# Patient Record
Sex: FEMALE | Race: WHITE | Employment: OTHER | URBAN - METROPOLITAN AREA
[De-identification: names, ages, dates, MRNs, and addresses within clinical notes are randomized per-mention and may not be internally consistent; named-entity substitution may affect disease eponyms.]

---

## 2016-12-15 LAB — HBA1C MFR BLD: 7.9 %

## 2017-01-20 ENCOUNTER — OFFICE VISIT (OUTPATIENT)
Dept: INTERNAL MEDICINE CLINIC | Age: 67
End: 2017-01-20

## 2017-01-20 VITALS
DIASTOLIC BLOOD PRESSURE: 70 MMHG | WEIGHT: 164 LBS | HEART RATE: 108 BPM | BODY MASS INDEX: 27.32 KG/M2 | TEMPERATURE: 98.1 F | SYSTOLIC BLOOD PRESSURE: 130 MMHG | HEIGHT: 65 IN | OXYGEN SATURATION: 97 %

## 2017-01-20 DIAGNOSIS — E11.9 TYPE 2 DIABETES MELLITUS WITHOUT COMPLICATION, WITH LONG-TERM CURRENT USE OF INSULIN (HCC): ICD-10-CM

## 2017-01-20 DIAGNOSIS — Z79.4 TYPE 2 DIABETES MELLITUS WITHOUT COMPLICATION, WITH LONG-TERM CURRENT USE OF INSULIN (HCC): ICD-10-CM

## 2017-01-20 DIAGNOSIS — J40 BRONCHITIS: Primary | ICD-10-CM

## 2017-01-20 PROCEDURE — 3014F SCREEN MAMMO DOC REV: CPT | Performed by: INTERNAL MEDICINE

## 2017-01-20 PROCEDURE — 1123F ACP DISCUSS/DSCN MKR DOCD: CPT | Performed by: INTERNAL MEDICINE

## 2017-01-20 PROCEDURE — G8427 DOCREV CUR MEDS BY ELIG CLIN: HCPCS | Performed by: INTERNAL MEDICINE

## 2017-01-20 PROCEDURE — G8400 PT W/DXA NO RESULTS DOC: HCPCS | Performed by: INTERNAL MEDICINE

## 2017-01-20 PROCEDURE — 4040F PNEUMOC VAC/ADMIN/RCVD: CPT | Performed by: INTERNAL MEDICINE

## 2017-01-20 PROCEDURE — 1036F TOBACCO NON-USER: CPT | Performed by: INTERNAL MEDICINE

## 2017-01-20 PROCEDURE — 3017F COLORECTAL CA SCREEN DOC REV: CPT | Performed by: INTERNAL MEDICINE

## 2017-01-20 PROCEDURE — 99213 OFFICE O/P EST LOW 20 MIN: CPT | Performed by: INTERNAL MEDICINE

## 2017-01-20 PROCEDURE — G8420 CALC BMI NORM PARAMETERS: HCPCS | Performed by: INTERNAL MEDICINE

## 2017-01-20 PROCEDURE — 3046F HEMOGLOBIN A1C LEVEL >9.0%: CPT | Performed by: INTERNAL MEDICINE

## 2017-01-20 PROCEDURE — 1090F PRES/ABSN URINE INCON ASSESS: CPT | Performed by: INTERNAL MEDICINE

## 2017-01-20 PROCEDURE — G8484 FLU IMMUNIZE NO ADMIN: HCPCS | Performed by: INTERNAL MEDICINE

## 2017-01-20 RX ORDER — FLUOCINONIDE TOPICAL SOLUTION USP, 0.05% 0.5 MG/ML
SOLUTION TOPICAL 2 TIMES DAILY
COMMUNITY
End: 2019-08-09 | Stop reason: CLARIF

## 2017-01-20 RX ORDER — BENZONATATE 100 MG/1
100 CAPSULE ORAL 3 TIMES DAILY PRN
Qty: 30 CAPSULE | Refills: 1 | Status: SHIPPED | OUTPATIENT
Start: 2017-01-20 | End: 2017-01-27

## 2017-01-20 RX ORDER — DOXYCYCLINE HYCLATE 50 MG/1
50 CAPSULE ORAL 2 TIMES DAILY
COMMUNITY
End: 2019-04-01 | Stop reason: CLARIF

## 2017-01-20 RX ORDER — CEFUROXIME AXETIL 250 MG/1
250 TABLET ORAL 2 TIMES DAILY
Qty: 20 TABLET | Refills: 0 | Status: SHIPPED | OUTPATIENT
Start: 2017-01-20 | End: 2017-01-30

## 2017-01-20 ASSESSMENT — ENCOUNTER SYMPTOMS
EYES NEGATIVE: 1
COUGH: 1

## 2019-05-10 ENCOUNTER — HOSPITAL ENCOUNTER (OUTPATIENT)
Dept: GENERAL RADIOLOGY | Age: 69
Discharge: HOME | End: 2019-05-10
Payer: MEDICARE

## 2019-05-10 ENCOUNTER — HOSPITAL ENCOUNTER (OUTPATIENT)
Age: 69
Discharge: HOME | End: 2019-05-10
Payer: MEDICARE

## 2019-05-10 DIAGNOSIS — M19.049 OSTEOARTHRITIS OF FINGER, UNSPECIFIED LATERALITY: ICD-10-CM

## 2019-05-10 PROCEDURE — 73130 X-RAY EXAM OF HAND: CPT

## 2019-05-22 ENCOUNTER — HOSPITAL ENCOUNTER (OUTPATIENT)
Dept: GENERAL RADIOLOGY | Age: 69
Discharge: HOME | End: 2019-05-22
Payer: MEDICARE

## 2019-05-22 ENCOUNTER — HOSPITAL ENCOUNTER (OUTPATIENT)
Age: 69
Discharge: HOME | End: 2019-05-22
Payer: MEDICARE

## 2019-05-22 ENCOUNTER — OFFICE VISIT (OUTPATIENT)
Dept: RHEUMATOLOGY | Age: 69
End: 2019-05-22
Payer: MEDICARE

## 2019-05-22 VITALS
BODY MASS INDEX: 25.58 KG/M2 | SYSTOLIC BLOOD PRESSURE: 134 MMHG | HEIGHT: 66 IN | WEIGHT: 159.2 LBS | HEART RATE: 84 BPM | DIASTOLIC BLOOD PRESSURE: 75 MMHG

## 2019-05-22 DIAGNOSIS — M19.049 HAND ARTHRITIS: Primary | ICD-10-CM

## 2019-05-22 DIAGNOSIS — M19.049 HAND ARTHRITIS: ICD-10-CM

## 2019-05-22 DIAGNOSIS — Z11.59 ENCOUNTER FOR SCREENING FOR OTHER VIRAL DISEASES: ICD-10-CM

## 2019-05-22 LAB
A/G RATIO: 1.8 (ref 1.1–2.2)
ALBUMIN SERPL-MCNC: 4.2 G/DL (ref 3.4–5)
ALP BLD-CCNC: 70 U/L (ref 40–129)
ALT SERPL-CCNC: 13 U/L (ref 10–40)
ANION GAP SERPL CALCULATED.3IONS-SCNC: 10 MMOL/L (ref 3–16)
AST SERPL-CCNC: 18 U/L (ref 15–37)
BILIRUB SERPL-MCNC: 0.4 MG/DL (ref 0–1)
BUN BLDV-MCNC: 14 MG/DL (ref 7–20)
C-REACTIVE PROTEIN: 1.6 MG/L (ref 0–5.1)
CALCIUM SERPL-MCNC: 10 MG/DL (ref 8.3–10.6)
CHLORIDE BLD-SCNC: 104 MMOL/L (ref 99–110)
CO2: 29 MMOL/L (ref 21–32)
CREAT SERPL-MCNC: 0.5 MG/DL (ref 0.6–1.2)
GFR AFRICAN AMERICAN: >60
GFR NON-AFRICAN AMERICAN: >60
GLOBULIN: 2.4 G/DL
GLUCOSE BLD-MCNC: 89 MG/DL (ref 70–99)
HEPATITIS B CORE IGM ANTIBODY: NORMAL
HEPATITIS B SURFACE ANTIGEN INTERPRETATION: NORMAL
HEPATITIS C ANTIBODY INTERPRETATION: NORMAL
POTASSIUM SERPL-SCNC: 5.1 MMOL/L (ref 3.5–5.1)
RHEUMATOID FACTOR: 14 IU/ML
SEDIMENTATION RATE, ERYTHROCYTE: 5 MM/HR (ref 0–30)
SODIUM BLD-SCNC: 143 MMOL/L (ref 136–145)
TOTAL PROTEIN: 6.6 G/DL (ref 6.4–8.2)

## 2019-05-22 PROCEDURE — 99205 OFFICE O/P NEW HI 60 MIN: CPT | Performed by: INTERNAL MEDICINE

## 2019-05-22 PROCEDURE — 73130 X-RAY EXAM OF HAND: CPT

## 2019-05-22 PROCEDURE — 1090F PRES/ABSN URINE INCON ASSESS: CPT | Performed by: INTERNAL MEDICINE

## 2019-05-22 PROCEDURE — 1036F TOBACCO NON-USER: CPT | Performed by: INTERNAL MEDICINE

## 2019-05-22 PROCEDURE — G8419 CALC BMI OUT NRM PARAM NOF/U: HCPCS | Performed by: INTERNAL MEDICINE

## 2019-05-22 PROCEDURE — 3017F COLORECTAL CA SCREEN DOC REV: CPT | Performed by: INTERNAL MEDICINE

## 2019-05-22 PROCEDURE — 1123F ACP DISCUSS/DSCN MKR DOCD: CPT | Performed by: INTERNAL MEDICINE

## 2019-05-22 PROCEDURE — 4040F PNEUMOC VAC/ADMIN/RCVD: CPT | Performed by: INTERNAL MEDICINE

## 2019-05-22 PROCEDURE — G8427 DOCREV CUR MEDS BY ELIG CLIN: HCPCS | Performed by: INTERNAL MEDICINE

## 2019-05-22 PROCEDURE — G8400 PT W/DXA NO RESULTS DOC: HCPCS | Performed by: INTERNAL MEDICINE

## 2019-05-22 RX ORDER — NAPROXEN 500 MG/1
500 TABLET ORAL 2 TIMES DAILY WITH MEALS
Qty: 60 TABLET | Refills: 1 | Status: SHIPPED | OUTPATIENT
Start: 2019-05-22 | End: 2020-03-18 | Stop reason: SDUPTHER

## 2019-05-22 NOTE — PROGRESS NOTES
dactylitis, enthesitis, tenosynovitis and uveitis. She had left hand x-ray which showed changes consistent with degenerative arthritis mainly in her DIP and PIP joints. HPI  Review of Systems    REVIEW OF SYSTEMS:   Constitutional: No unanticipated weight loss or fevers. No fatigue and malaise. Integumentary: No rash, photosensitivity, malar rash, livedo reticularis, alopecia and Raynaud's symptoms, sclerodactyly, skin tightening  Eyes: negative for visual disturbance and persistent redness, discharge from eyes   ENT: - No tinnitus, loss of hearing, vertigo, or recurrent ear infections.  - No history of nasal/oral ulcers. - No history of dry eyes/dry mouth  Cardiovascular: No history of pericarditis, chest pain or murmur or palpitations  Respiratory: No shortness of breath, cough or history of interstitial lung disease. No history of pleurisy. No history of tuberculosis or atypical infections. Gastrointestinal: No history of heart burn, dysphagia or esophageal dysmotility. No change in bowel habits or any inflammatory bowel disease. Genitourinary: No history renal disease, miscarriages. Hematologic/Lymphatic: No abnormal bruising or bleeding, blood clots or swollen lymph nodes. Neurological: No history of headaches, seizure or focal weakness. No history of neuropathies, paresthesias or hyperesthesias, facial droop, diplopia  Psychiatric: No history of bipolar disease, depression  Endocrine: Denies any polyuria, polydipsia and osteoporosis  Allergic/Immunologic: No nasal congestion or hives. I have reviewed patients Past medical History, Social History and Family History as mentioned in her chart and this remains unchanged fromprevious.     Past Medical History:   Diagnosis Date    Anxiety     DM (diabetes mellitus), type 1 ([facility] [address]     Hyperlipidemia     Hypertension     Osteoarthritis     Urinary incontinence      Past Surgical History:   Procedure Laterality Date    APPENDECTOMY       [address] [address]    2    COLONOSCOPY  4/23/2015    normal-ghastine    GALLBLADDER SURGERY  1996    KNEE ARTHROSCOPY Left 2001     Social History     Socioeconomic History    Marital status:      Spouse name: Not on file    Number of children: 2    Years of education: Not on file    Highest education level: Not on file   Occupational History    Occupation: Retired     Comment: elementary school   Social Needs    Financial resource strain: Not on file    Food insecurity:     Worry: Not on file     Inability: Not on file   Questra needs:     Medical: Not on file     Non-medical: Not on file   Tobacco Use    Smoking status: Never Smoker    Smokeless tobacco: Never Used   Substance and Sexual Activity    Alcohol use: No    Drug use: No    Sexual activity: Yes     Partners: Female, Male   Lifestyle    Physical activity:     Days per week: Not on file     Minutes per session: Not on file    Stress: Not on file   Relationships    Social connections:     Talks on phone: Not on file     Gets together: Not on file     Attends Hindu service: Not on file     Active member of club or organization: Not on file     Attends meetings of clubs or organizations: Not on file     Relationship status: Not on file    Intimate partner violence:     Fear of current or ex partner: Not on file     Emotionally abused: Not on file     Physically abused: Not on file     Forced sexual activity: Not on file   Other Topics Concern    Not on file   Social History Narrative    Not on file     Family History   Problem Relation Age of Onset    Cancer Mother         Tigress leukemia    Colon Cancer Father     Heart Disease Maternal Grandmother     Heart Disease Maternal Grandfather     Heart Attack Maternal Grandfather     Arthritis Paternal Grandmother          PHYSICAL EXAM   Vitals:    05/22/19 1025   BP: 134/75   Site: Left Lower Arm   Pulse: 84   Weight: 159 lb 3.2 oz (72.2 kg)   Height: 5' 5.5\" ASSESSMENT:    1. Hand arthritis    2. Encounter for screening for other viral diseases         PLAN:     Amparo Cope was seen today for new patient. 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 Left hand x-ray with degenerative changes consistent with erosive osteoarthritis. Doubt rheumatoid arthritis or systemic rheumatic disease  -I will do workup to completely rule out rheumatoid arthritis  -Obtain right hand x-ray for comparison  -Start naproxen 500 mg twice a day  -     Comprehensive Metabolic Panel; Future  -     C-Reactive Protein; Future  -     Cyclic Citrul Peptide Antibody, IgG; Future  -     Hepatitis B Core Antibody, IgM; Future  -     Hepatitis B Surface Antigen; Future  -     Hepatitis C Antibody; Future  -     Sedimentation Rate; Future  -     Rheumatoid Factor; Future  -     ARIELA; Future  -     XR HAND RIGHT (MIN 3 VIEWS); Future  -     naproxen (NAPROSYN) 500 MG tablet; Take 1 tablet by mouth 2 times daily (with meals)    Encounter for screening for other viral diseases   -     Hepatitis B Surface Antigen; Future    The patient was advised that NSAID-type medications have two very important potential side effects: gastrointestinal irritation including hemorrhage and renal injuries. She was asked to take the medication with food and to stop if she experiences any GI upset. I asked her to call for vomiting, abdominal pain or black/bloody stools. The patient expresses understanding of these issues and questions were answered. The patient indicates understanding of these issues and agrees with the plan. Return in about 3 weeks (around 6/12/2019). The risks and benefits of my recommendations, as well as other treatment options, benefits and side effects werediscussed with the patient. All questions were answered.     I reviewed patient's history, referral documents and electronic medical records  Copy of consult note is being routedelectronically/faxed to referring physician         ######################################################################    I thank you for giving me theopportunity to participate in [facility]. If you have any questions or concerns please feel free to contact me. I look forward to following  Trino Henry along with you. Electronically signed by: Katelin Denney MD, 5/22/2019 11:14 AM    Documentation was done using voice recognition dragon software. Every effort was made to ensure accuracy;however, inadvertent unintentional computerized transcription errors may be present.

## 2019-05-23 LAB — ANTI-NUCLEAR ANTIBODY (ANA): NEGATIVE

## 2019-05-24 LAB — CCP IGG ANTIBODIES: 2 UNITS (ref 0–19)

## 2019-05-28 ENCOUNTER — TELEPHONE (OUTPATIENT)
Dept: RHEUMATOLOGY | Age: 69
End: 2019-05-28

## 2019-06-13 ENCOUNTER — OFFICE VISIT (OUTPATIENT)
Dept: RHEUMATOLOGY | Age: 69
End: 2019-06-13
Payer: MEDICARE

## 2019-06-13 VITALS
WEIGHT: 162 LBS | BODY MASS INDEX: 26.03 KG/M2 | DIASTOLIC BLOOD PRESSURE: 79 MMHG | HEART RATE: 91 BPM | SYSTOLIC BLOOD PRESSURE: 142 MMHG | HEIGHT: 66 IN

## 2019-06-13 DIAGNOSIS — R20.2 NUMBNESS AND TINGLING IN LEFT HAND: ICD-10-CM

## 2019-06-13 DIAGNOSIS — M19.049 HAND ARTHRITIS: Primary | ICD-10-CM

## 2019-06-13 DIAGNOSIS — R20.0 NUMBNESS AND TINGLING IN LEFT HAND: ICD-10-CM

## 2019-06-13 PROCEDURE — 4040F PNEUMOC VAC/ADMIN/RCVD: CPT | Performed by: INTERNAL MEDICINE

## 2019-06-13 PROCEDURE — 1036F TOBACCO NON-USER: CPT | Performed by: INTERNAL MEDICINE

## 2019-06-13 PROCEDURE — G8400 PT W/DXA NO RESULTS DOC: HCPCS | Performed by: INTERNAL MEDICINE

## 2019-06-13 PROCEDURE — G8427 DOCREV CUR MEDS BY ELIG CLIN: HCPCS | Performed by: INTERNAL MEDICINE

## 2019-06-13 PROCEDURE — 99214 OFFICE O/P EST MOD 30 MIN: CPT | Performed by: INTERNAL MEDICINE

## 2019-06-13 PROCEDURE — 3017F COLORECTAL CA SCREEN DOC REV: CPT | Performed by: INTERNAL MEDICINE

## 2019-06-13 PROCEDURE — 1090F PRES/ABSN URINE INCON ASSESS: CPT | Performed by: INTERNAL MEDICINE

## 2019-06-13 PROCEDURE — 1123F ACP DISCUSS/DSCN MKR DOCD: CPT | Performed by: INTERNAL MEDICINE

## 2019-06-13 PROCEDURE — G8419 CALC BMI OUT NRM PARAM NOF/U: HCPCS | Performed by: INTERNAL MEDICINE

## 2019-06-13 NOTE — PROGRESS NOTES
had left hand x-ray which showed changes consistent with degenerative arthritis mainly in her DIP and PIP joints. Interim history:. She presents for a follow-up of hand arthritis. She is on naproxen 500 mg twice a day and has noted significant improvement in joint pain, swelling and stiffness. Blood work shows positive RF 14, CCP negative, normal ESR and CRP. She also does hand exercises which helps. Hand x-rays with erosive osteoarthritis. She is complaining of tingling and numbness in the left third and fourth finger throughout the day as well as at night. She denies any weakness. HPI  Review of Systems    REVIEW OF SYSTEMS:   Constitutional: No unanticipated weight loss or fevers. No fatigue and malaise. Integumentary: No rash, photosensitivity, malar rash, livedo reticularis, alopecia and Raynaud's symptoms, sclerodactyly, skin tightening  Eyes: negative for visual disturbance and persistent redness, discharge from eyes   ENT: - No tinnitus, loss of hearing, vertigo, or recurrent ear infections.  - No history of nasal/oral ulcers. - No history of dry eyes/dry mouth  Cardiovascular: No history of pericarditis, chest pain or murmur or palpitations  Respiratory: No shortness of breath, cough or history of interstitial lung disease. No history of pleurisy. No history of tuberculosis or atypical infections. Gastrointestinal: No history of heart burn, dysphagia or esophageal dysmotility. No change in bowel habits or any inflammatory bowel disease. Genitourinary: No history renal disease, miscarriages. Hematologic/Lymphatic: No abnormal bruising or bleeding, blood clots or swollen lymph nodes. Neurological: No history of headaches, seizure or focal weakness.  No history of neuropathies, paresthesias or hyperesthesias, facial droop, diplopia  Psychiatric: No history of bipolar disease, depression  Endocrine: Denies any polyuria, polydipsia and osteoporosis  Allergic/Immunologic: No nasal congestion or hives.        I have reviewed patients Past medical History, Social History and Family History as mentioned in her chart and this remains unchanged fromprevious.     Past Medical History:   Diagnosis Date    Anxiety     DM (diabetes mellitus), type 1 ([address]     Hyperlipidemia     Hypertension     Osteoarthritis     Urinary incontinence      Past Surgical History:   Procedure Laterality Date    APPENDECTOMY  1968     SECTION  ,     2    COLONOSCOPY  2015    normal-ghastine    GALLBLADDER SURGERY  1996    KNEE ARTHROSCOPY Left      Social History     Socioeconomic History    Marital status:      Spouse name: Not on file    Number of children: 2    Years of education: Not on file    Highest education level: Not on file   Occupational History    Occupation: Retired     Comment: elementary school   Social Needs    Financial resource strain: Not on file    Food insecurity:     Worry: Not on file     Inability: Not on file   Paired Health needs:     Medical: Not on file     Non-medical: Not on file   Tobacco Use    Smoking status: Never Smoker    Smokeless tobacco: Never Used   Substance and Sexual Activity    Alcohol use: No    Drug use: No    Sexual activity: Yes     Partners: Female, Male   Lifestyle    Physical activity:     Days per week: Not on file     Minutes per session: Not on file    Stress: Not on file   Relationships    Social connections:     Talks on phone: Not on file     Gets together: Not on file     Attends Islam service: Not on file     Active member of club or organization: Not on file     Attends meetings of clubs or organizations: Not on file     Relationship status: Not on file    Intimate partner violence:     Fear of current or ex partner: Not on file     Emotionally abused: Not on file     Physically abused: Not on file     Forced sexual activity: Not on file   Other Topics Concern    Not on file   Social History Narrative    Not on file     Family History   Problem Relation Age of Onset    Cancer Mother         Tigress leukemia    Colon Cancer Father     Heart Disease Maternal Grandmother     Heart Disease Maternal Grandfather     Heart Attack Maternal Grandfather     Arthritis Paternal Grandmother          PHYSICAL EXAM   Vitals:    06/13/19 1108   BP: (!) 142/79   Site: Left Lower Arm   Pulse: 91   Weight: 162 lb (73.5 kg)   Height: 5' 5.5\" (1.664 m)     Physical Exam  Constitutional:  Well developed, well nourished, no acute distress, non-toxic appearance   Musculoskeletal:    RIGHT  Swell  Tender  ROM  LEFT  Swell  Tender  ROM    DIP2  0  0  Heberden   0  0  Heberden    DIP3  0  0  Heberden   0  0  Heberden    DIP4  0  0  Heberden   0  0  Heberden    DIP5  0  0  Heberden   0  0  Heberden    PIP1  0  0  Bony change   0  0  Bony change    PIP2  0 0 Bony change   0 0 Bony change    PIP3  + + Bony change   + + Bony change    PIP4  0  0  FULL   0  0  FULL    PIP5  0  0  FULL   0  0  FULL    MCP1  0  0  FULL   0  0  FULL    MCP2  0  0  FULL   0  0  FULL    MCP3  0  0  FULL   0  0  FULL    MCP4  0  0  FULL   0  0  FULL    MCP5  0  0  FULL   0  0  FULL    Wrist  0  0  FULL   0  0  FULL    Elbow  0  0  FULL   0  0  FULL    Shouldr  0  0  FULL   0  0  FULL    Hip  0  0  FULL   0  0  FULL    Knee  0  0  FULL   0  0  FULL    Ankle  0  0  FULL   0  0  FULL    MTP1  0  0  FULL   0  0  FULL    MTP2  0  0  FULL   0  0  FULL    MTP3  0  0  FULL   0  0  FULL    MTP4  0  0  FULL   0  0  FULL    MTP5  0  0  FULL   0  0  FULL    IP1  0  0  FULL   0  0  FULL    IP2  0  0  FULL   0  0  FULL    IP3  0  0  FULL   0  0  FULL    IP4  0  0  FULL   0  0  FULL    IP5  0  0  FULL   0 0 FULL     Bony enlargement and squaring of bilateral CMC joints. Ambulates without assistance, normal gait  Neck: Full ROM, no tenderness,supple   Back- no tenderness.   Eyes:  PERRL, extra ocular movements intact, conjunctiva normal   HEENT:  Atraumatic, normocephalic, external ears normal, oropharynx moist, no pharyngeal exudates. Respiratory:  No respiratory distress  GI:  Soft, nondistended, normal bowel sounds, nontender, noorganomegaly, no mass, no rebound, no guarding   :  No costovertebral angle tenderness   Integument:  Well hydrated, no rash or telangiectasias  Lymphatic:  No lymphadenopathy noted   Neurologic:   Alert & oriented x 3, CN 2-12 normal, no focal deficits noted. Sensations Intact. Muscle strength 5/5 proximallyand distally in upper and lower extremities.    Psychiatric:  Speech and behavior appropriate           LABS AND IMAGING  Outside data reviewed and in HPI    Lab Results   Component Value Date    WBC 6.6 07/06/2011    RBC 4.55 07/06/2011    HGB 14.0 07/06/2011    HCT 43.2 07/06/2011     07/06/2011    MCV 94.8 07/06/2011    MCH 30.8 07/06/2011    MCHC 32.5 07/06/2011    RDW 14.1 07/06/2011    SEGSPCT 50.9 07/06/2011    LYMPHOPCT 38.7 07/06/2011    MONOPCT 8.2 07/06/2011    EOSPCT 1.7 07/06/2011    BASOPCT 0.5 07/06/2011    MONOSABS 541 07/06/2011    LYMPHSABS 2,554 07/06/2011    EOSABS 112 07/06/2011    BASOSABS 33 07/06/2011       Chemistry        Component Value Date/Time     05/22/2019 1135    K 5.1 05/22/2019 1135     05/22/2019 1135    CO2 29 05/22/2019 1135    BUN 14 05/22/2019 1135    CREATININE 0.5 (L) 05/22/2019 1135        Component Value Date/Time    CALCIUM 10.0 05/22/2019 1135    ALKPHOS 70 05/22/2019 1135    AST 18 05/22/2019 1135    ALT 13 05/22/2019 1135    BILITOT 0.4 05/22/2019 1135          Lab Results   Component Value Date    SEDRATE 5 05/22/2019     Lab Results   Component Value Date    CRP 1.6 05/22/2019     Lab Results   Component Value Date    ARIELA Negative 05/22/2019     Lab Results   Component Value Date    RF 14.0 05/22/2019    CCPABIGG 2 05/22/2019     Lab Results   Component Value Date    ARIELA Negative 05/22/2019     No results found for: DSDNAG, DSDNAIGGIFA  No results found for: SSAROAB, SSALAAB  No results found for: SMAB, RNPAB  No results found for: CENTABIGG  No results found for: C3, C4, ACE  No results found for: [place]a [place], [id number]  No results found for: Paige Si  No results found for: Clejosea Glassing  Lab Results   Component Value Date    TSH 3.98 07/06/2011     No results found for: VITD25    Radiology:    Left hand X-ray images were reviewed independently-degenerative arthritis involving DIP and PIP joints with SEA GULL appearance consistent with erosive osteoarthritis. No evidence of rheumatoid arthritis    ASSESSMENT AND PLAN      Assessment/Plan:      ASSESSMENT:    1. Hand arthritis    2. Numbness and tingling in left hand        PLAN:   1. Hand arthritis  Most likely erosive osteoarthritis. Bilateral hand x-rays with erosive osteoarthritis. RF 14, CCP negative, normal ESR and CRP  -Significant improvement with naproxen 500 mg twice a day, will continue  -Paraffin wax bath  Continue hand exercises-    2. Numbness and tingling in left hand  We will check a left upper extremity EMG to rule out carpal tunnel syndrome  - Seth Ambrosio MD (Inpatient and Outpatient EMG), [facility]    The patient indicates understanding of these issues and agrees with the plan. Return in about 3 months (around 9/13/2019). The risks and benefits of my recommendations, as well as other treatment options, benefits and side effects werediscussed with the patient. All questions were answered. ######################################################################    I thank you for giving me theopportunity to participate in [facility]. If you have any questions or concerns please feel free to contact me. I look forward to following  Jaylon Goodson along with you. Electronically signed by: Mayte Russell MD, 6/13/2019 11:26 AM    Documentation was done using voice recognition dragon software.   Every effort was made to ensure accuracy;however, inadvertent unintentional computerized transcription errors may be present.

## 2019-06-14 ENCOUNTER — PROCEDURE VISIT (OUTPATIENT)
Dept: NEUROLOGY | Age: 69
End: 2019-06-14
Payer: MEDICARE

## 2019-06-14 DIAGNOSIS — R20.0 LEFT ARM NUMBNESS: Primary | ICD-10-CM

## 2019-06-14 PROCEDURE — 95909 NRV CNDJ TST 5-6 STUDIES: CPT | Performed by: PSYCHIATRY & NEUROLOGY

## 2019-06-14 PROCEDURE — 95886 MUSC TEST DONE W/N TEST COMP: CPT | Performed by: PSYCHIATRY & NEUROLOGY

## 2019-06-17 ENCOUNTER — TELEPHONE (OUTPATIENT)
Dept: RHEUMATOLOGY | Age: 69
End: 2019-06-17

## 2019-06-17 DIAGNOSIS — G56.22 LESION OF LEFT ULNAR NERVE: Primary | ICD-10-CM

## 2019-06-27 ENCOUNTER — OFFICE VISIT (OUTPATIENT)
Dept: ORTHOPEDIC SURGERY | Age: 69
End: 2019-06-27
Payer: MEDICARE

## 2019-06-27 VITALS
HEIGHT: 66 IN | SYSTOLIC BLOOD PRESSURE: 135 MMHG | WEIGHT: 162 LBS | DIASTOLIC BLOOD PRESSURE: 75 MMHG | RESPIRATION RATE: 16 BRPM | BODY MASS INDEX: 26.03 KG/M2

## 2019-06-27 DIAGNOSIS — G56.22 CUBITAL TUNNEL SYNDROME, LEFT: Primary | ICD-10-CM

## 2019-06-27 PROCEDURE — 4040F PNEUMOC VAC/ADMIN/RCVD: CPT | Performed by: ORTHOPAEDIC SURGERY

## 2019-06-27 PROCEDURE — G8400 PT W/DXA NO RESULTS DOC: HCPCS | Performed by: ORTHOPAEDIC SURGERY

## 2019-06-27 PROCEDURE — G8427 DOCREV CUR MEDS BY ELIG CLIN: HCPCS | Performed by: ORTHOPAEDIC SURGERY

## 2019-06-27 PROCEDURE — 3017F COLORECTAL CA SCREEN DOC REV: CPT | Performed by: ORTHOPAEDIC SURGERY

## 2019-06-27 PROCEDURE — G8419 CALC BMI OUT NRM PARAM NOF/U: HCPCS | Performed by: ORTHOPAEDIC SURGERY

## 2019-06-27 PROCEDURE — 1090F PRES/ABSN URINE INCON ASSESS: CPT | Performed by: ORTHOPAEDIC SURGERY

## 2019-06-27 PROCEDURE — 1036F TOBACCO NON-USER: CPT | Performed by: ORTHOPAEDIC SURGERY

## 2019-06-27 PROCEDURE — 1123F ACP DISCUSS/DSCN MKR DOCD: CPT | Performed by: ORTHOPAEDIC SURGERY

## 2019-06-27 PROCEDURE — 99203 OFFICE O/P NEW LOW 30 MIN: CPT | Performed by: ORTHOPAEDIC SURGERY

## 2019-06-27 NOTE — PROGRESS NOTES
This 76 y.o. right hand dominant retired woman is seen in consultation for Dr. Sukhwinder Reynolds with a chief complaint of numbness and tingling of the middleand ring fingers of the left hand. Symptoms have been present for 2 months and will frequently radiate proximally to the ulnar aspect of the hand, forearm and elbow. The patient will occasionally notice weakness and loss of dexterity. Symptoms have persisted and are slowly becoming worse   There is no history of significant injury or history of cervical disc disease. There is not a history of similar symptoms in the opposite upper extremity. The pain assessment has been reviewed and is correct as stated. .    The patient's social history, past medical history, family history, medications, allergies and review of systems, entered 6/27/19, have been reviewed, and dated and are recorded in the chart. On physical examination the patient is Height: 5' 5.5\" (166.4 cm) tall and weighs Weight: 162 lb (73.5 kg). Respirations are 18 per minute. The patient is well nourished, is oriented to time and place, demonstrates appropriate mood and affect as well as normal gait and station. There is moderate left intrinsic muscle atrophy . There is no swelling or discoloration. There is no clawing of the little finger. Tinel's sign is positive over the ulnar nerve at the left elbow. The elbow flexion test is positive on the left at 10 seconds. There is decreased sensation in the ulnar nerve distribution of the left hand. There is mild/moderate weakness of the abductor digiti minimi and 1st dorsal interosseous muscles. Range of motion of the fingers, thumbs, wrists, forearms and elbows is full and painless. Skin is intact as is distal circulation bilaterally. All joints are stable. Deep tendon reflexes are present bilaterally. There are no enlarged epitrochlear lymph nodes.     EMG: An EMG study, dated 6/14/19 , is normal. There is moderately severe left cubital tunnel syndrome. Impression: Compression neuropathy ulnar nerve left elbow. The nature of this medical problem is fully discussed with the patient and family member, including all treatment options, surgery, risks, prognosis and postoperative care. All questions are answered. She will think this over and call me back if she has any additional questions or wishes to schedule surgery.

## 2019-08-01 ENCOUNTER — TELEPHONE (OUTPATIENT)
Dept: ORTHOPEDIC SURGERY | Age: 69
End: 2019-08-01

## 2019-08-01 NOTE — TELEPHONE ENCOUNTER
Pt wants to scheduled l ulnar surg now. Just need pink card if ok. She wants reassurance from Jimena Schmitt before scheduling     [phone]    Arm wakes her up, dropping things.   Please call

## 2019-08-01 NOTE — LETTER
[facility]  Surgery Scheduling Form:  DEMOGRAPHICS:                                                                                                                Patient Name:  Zakia Lundberg  Patient :  1950   Patient SS#:  [id number]    Patient Phone:  [phone]   Patient Address:  [address] [place] [address] [facility] [address]    PCP:  Avis Restrepo MD  Insurance:    Payor/Plan Subscr  Sex Relation Sub. Ins. ID Effective Group Num   1. MEDICARE - Jada Tateu* 1950 Female  [id number]                                    [address] [place] 3/10/1944 Male  [id number] [birth year] [id number]                                   [address]     DIAGNOSIS & PROCEDURE:                                                                                              Diagnosis:   left Cubital Tunnel Syndrome / Ulnar Nerve Entrapment @ Elbow (354.2)                                                                                  G56.22  Operation:  left  Ulnar Nerve Decompression  (at Elbow)  [CPT: 96284]  Location:  [facility] AT [place]  Surgeon:  Maddie Mandujano    SCHEDULING INFORMATION:                                                                                         .  Surgeon's Scheduling Instruction:  Elective / HAS INSULIN PUMP    Requested Date:    OR Time:  11:00 Patient Arrival Time:  9:30    OR Time Required:  20  Minutes  Anesthesia:  General  Equipment:  None  Mini C-Arm:  No   Standard C-Arm:  No  Status:  outpatient  PAT Required: Yes               ALLERGIES:   SEE LIST  Comments: Please remind the patient to stop taking aspirin 7 days before surgery. Haroon Tinoco MD  19     BILLING INFORMATION:                                                                                                    Procedure:       CPT Code Modifier

## 2019-08-09 PROBLEM — E10.8 TYPE 1 DIABETES MELLITUS WITH COMPLICATION (HCC): Status: ACTIVE | Noted: 2019-08-09

## 2019-08-21 ENCOUNTER — TELEPHONE (OUTPATIENT)
Dept: ORTHOPEDIC SURGERY | Age: 69
End: 2019-08-21

## 2019-08-21 ENCOUNTER — ANESTHESIA EVENT (OUTPATIENT)
Dept: OPERATING ROOM | Age: 69
End: 2019-08-21
Payer: MEDICARE

## 2019-08-21 NOTE — TELEPHONE ENCOUNTER
CBW's recommendation to f/u post op. Pt may f/u when she is back in town, however, if she chooses not too, that is pts decision.

## 2019-08-22 ENCOUNTER — ANESTHESIA (OUTPATIENT)
Dept: OPERATING ROOM | Age: 69
End: 2019-08-22
Payer: MEDICARE

## 2019-08-22 ENCOUNTER — HOSPITAL ENCOUNTER (OUTPATIENT)
Age: 69
Setting detail: OUTPATIENT SURGERY
Discharge: HOME | End: 2019-08-22
Attending: ORTHOPAEDIC SURGERY | Admitting: ORTHOPAEDIC SURGERY
Payer: MEDICARE

## 2019-08-22 VITALS
BODY MASS INDEX: 25.35 KG/M2 | RESPIRATION RATE: 16 BRPM | OXYGEN SATURATION: 96 % | HEIGHT: 66 IN | SYSTOLIC BLOOD PRESSURE: 121 MMHG | WEIGHT: 157.74 LBS | HEART RATE: 81 BPM | TEMPERATURE: 97.1 F | DIASTOLIC BLOOD PRESSURE: 61 MMHG

## 2019-08-22 VITALS
RESPIRATION RATE: 6 BRPM | TEMPERATURE: 96.8 F | OXYGEN SATURATION: 99 % | SYSTOLIC BLOOD PRESSURE: 98 MMHG | DIASTOLIC BLOOD PRESSURE: 50 MMHG

## 2019-08-22 LAB
GLUCOSE BLD-MCNC: 110 MG/DL (ref 70–99)
GLUCOSE BLD-MCNC: 125 MG/DL (ref 70–99)
GLUCOSE BLD-MCNC: 63 MG/DL (ref 70–99)
GLUCOSE BLD-MCNC: 69 MG/DL (ref 70–99)
GLUCOSE BLD-MCNC: 69 MG/DL (ref 70–99)
PERFORMED ON: ABNORMAL

## 2019-08-22 PROCEDURE — 2709999900 HC NON-CHARGEABLE SUPPLY: Performed by: ORTHOPAEDIC SURGERY

## 2019-08-22 PROCEDURE — 3700000001 HC ADD 15 MINUTES (ANESTHESIA): Performed by: ORTHOPAEDIC SURGERY

## 2019-08-22 PROCEDURE — 7100000000 HC PACU RECOVERY - FIRST 15 MIN: Performed by: ORTHOPAEDIC SURGERY

## 2019-08-22 PROCEDURE — 2580000003 HC RX 258: Performed by: ANESTHESIOLOGY

## 2019-08-22 PROCEDURE — 6360000002 HC RX W HCPCS: Performed by: NURSE ANESTHETIST, CERTIFIED REGISTERED

## 2019-08-22 PROCEDURE — 3600000005 HC SURGERY LEVEL 5 BASE: Performed by: ORTHOPAEDIC SURGERY

## 2019-08-22 PROCEDURE — 2500000003 HC RX 250 WO HCPCS: Performed by: ORTHOPAEDIC SURGERY

## 2019-08-22 PROCEDURE — 3600000015 HC SURGERY LEVEL 5 ADDTL 15MIN: Performed by: ORTHOPAEDIC SURGERY

## 2019-08-22 PROCEDURE — 2500000003 HC RX 250 WO HCPCS: Performed by: NURSE ANESTHETIST, CERTIFIED REGISTERED

## 2019-08-22 PROCEDURE — 7100000001 HC PACU RECOVERY - ADDTL 15 MIN: Performed by: ORTHOPAEDIC SURGERY

## 2019-08-22 PROCEDURE — 7100000010 HC PHASE II RECOVERY - FIRST 15 MIN: Performed by: ORTHOPAEDIC SURGERY

## 2019-08-22 PROCEDURE — 3700000000 HC ANESTHESIA ATTENDED CARE: Performed by: ORTHOPAEDIC SURGERY

## 2019-08-22 PROCEDURE — 7100000011 HC PHASE II RECOVERY - ADDTL 15 MIN: Performed by: ORTHOPAEDIC SURGERY

## 2019-08-22 PROCEDURE — 6370000000 HC RX 637 (ALT 250 FOR IP): Performed by: ANESTHESIOLOGY

## 2019-08-22 RX ORDER — DEXAMETHASONE SODIUM PHOSPHATE 4 MG/ML
INJECTION, SOLUTION INTRA-ARTICULAR; INTRALESIONAL; INTRAMUSCULAR; INTRAVENOUS; SOFT TISSUE PRN
Status: DISCONTINUED | OUTPATIENT
Start: 2019-08-22 | End: 2019-08-22 | Stop reason: SDUPTHER

## 2019-08-22 RX ORDER — LIDOCAINE HYDROCHLORIDE 20 MG/ML
INJECTION, SOLUTION EPIDURAL; INFILTRATION; INTRACAUDAL; PERINEURAL PRN
Status: DISCONTINUED | OUTPATIENT
Start: 2019-08-22 | End: 2019-08-22 | Stop reason: SDUPTHER

## 2019-08-22 RX ORDER — FENTANYL CITRATE 50 UG/ML
50 INJECTION, SOLUTION INTRAMUSCULAR; INTRAVENOUS EVERY 5 MIN PRN
Status: DISCONTINUED | OUTPATIENT
Start: 2019-08-22 | End: 2019-08-22 | Stop reason: HOSPADM

## 2019-08-22 RX ORDER — SODIUM CHLORIDE 0.9 % (FLUSH) 0.9 %
10 SYRINGE (ML) INJECTION EVERY 12 HOURS SCHEDULED
Status: DISCONTINUED | OUTPATIENT
Start: 2019-08-22 | End: 2019-08-22 | Stop reason: HOSPADM

## 2019-08-22 RX ORDER — ONDANSETRON 2 MG/ML
INJECTION INTRAMUSCULAR; INTRAVENOUS PRN
Status: DISCONTINUED | OUTPATIENT
Start: 2019-08-22 | End: 2019-08-22 | Stop reason: SDUPTHER

## 2019-08-22 RX ORDER — PROPOFOL 10 MG/ML
INJECTION, EMULSION INTRAVENOUS PRN
Status: DISCONTINUED | OUTPATIENT
Start: 2019-08-22 | End: 2019-08-22 | Stop reason: SDUPTHER

## 2019-08-22 RX ORDER — ONDANSETRON 2 MG/ML
4 INJECTION INTRAMUSCULAR; INTRAVENOUS
Status: DISCONTINUED | OUTPATIENT
Start: 2019-08-22 | End: 2019-08-22 | Stop reason: HOSPADM

## 2019-08-22 RX ORDER — IBUPROFEN 400 MG/1
800 TABLET ORAL
Status: COMPLETED | OUTPATIENT
Start: 2019-08-22 | End: 2019-08-22

## 2019-08-22 RX ORDER — FENTANYL CITRATE 50 UG/ML
INJECTION, SOLUTION INTRAMUSCULAR; INTRAVENOUS PRN
Status: DISCONTINUED | OUTPATIENT
Start: 2019-08-22 | End: 2019-08-22 | Stop reason: SDUPTHER

## 2019-08-22 RX ORDER — FENTANYL CITRATE 50 UG/ML
25 INJECTION, SOLUTION INTRAMUSCULAR; INTRAVENOUS EVERY 5 MIN PRN
Status: DISCONTINUED | OUTPATIENT
Start: 2019-08-22 | End: 2019-08-22 | Stop reason: HOSPADM

## 2019-08-22 RX ORDER — SODIUM CHLORIDE 9 MG/ML
INJECTION, SOLUTION INTRAVENOUS CONTINUOUS
Status: DISCONTINUED | OUTPATIENT
Start: 2019-08-22 | End: 2019-08-22 | Stop reason: HOSPADM

## 2019-08-22 RX ORDER — BUPIVACAINE HYDROCHLORIDE 5 MG/ML
INJECTION, SOLUTION EPIDURAL; INTRACAUDAL
Status: COMPLETED | OUTPATIENT
Start: 2019-08-22 | End: 2019-08-22

## 2019-08-22 RX ORDER — IBUPROFEN 400 MG/1
800 TABLET ORAL EVERY 6 HOURS PRN
Status: DISCONTINUED | OUTPATIENT
Start: 2019-08-22 | End: 2019-08-22

## 2019-08-22 RX ORDER — SODIUM CHLORIDE 0.9 % (FLUSH) 0.9 %
10 SYRINGE (ML) INJECTION PRN
Status: DISCONTINUED | OUTPATIENT
Start: 2019-08-22 | End: 2019-08-22 | Stop reason: HOSPADM

## 2019-08-22 RX ADMIN — PROPOFOL 150 MG: 10 INJECTION, EMULSION INTRAVENOUS at 10:45

## 2019-08-22 RX ADMIN — IBUPROFEN 800 MG: 400 TABLET ORAL at 12:53

## 2019-08-22 RX ADMIN — ONDANSETRON 4 MG: 2 INJECTION INTRAMUSCULAR; INTRAVENOUS at 10:49

## 2019-08-22 RX ADMIN — LIDOCAINE HYDROCHLORIDE 60 MG: 20 INJECTION, SOLUTION EPIDURAL; INFILTRATION; INTRACAUDAL; PERINEURAL at 10:45

## 2019-08-22 RX ADMIN — FENTANYL CITRATE 50 MCG: 50 INJECTION INTRAMUSCULAR; INTRAVENOUS at 10:45

## 2019-08-22 RX ADMIN — DEXAMETHASONE SODIUM PHOSPHATE 4 MG: 4 INJECTION, SOLUTION INTRAMUSCULAR; INTRAVENOUS at 10:49

## 2019-08-22 RX ADMIN — SODIUM CHLORIDE: 9 INJECTION, SOLUTION INTRAVENOUS at 10:14

## 2019-08-22 RX ADMIN — FENTANYL CITRATE 50 MCG: 50 INJECTION INTRAMUSCULAR; INTRAVENOUS at 10:52

## 2019-08-22 ASSESSMENT — PULMONARY FUNCTION TESTS
PIF_VALUE: 12
PIF_VALUE: 4
PIF_VALUE: 10
PIF_VALUE: 0
PIF_VALUE: 4
PIF_VALUE: 12
PIF_VALUE: 11
PIF_VALUE: 15
PIF_VALUE: 3
PIF_VALUE: 4
PIF_VALUE: 0
PIF_VALUE: 4
PIF_VALUE: 1
PIF_VALUE: 3
PIF_VALUE: 1
PIF_VALUE: 5
PIF_VALUE: 1
PIF_VALUE: 12

## 2019-08-22 ASSESSMENT — PAIN SCALES - GENERAL
PAINLEVEL_OUTOF10: 0
PAINLEVEL_OUTOF10: 0
PAINLEVEL_OUTOF10: 4
PAINLEVEL_OUTOF10: 2

## 2019-08-22 ASSESSMENT — PAIN DESCRIPTION - FREQUENCY
FREQUENCY: CONTINUOUS
FREQUENCY: CONTINUOUS

## 2019-08-22 ASSESSMENT — PAIN DESCRIPTION - ONSET
ONSET: ON-GOING
ONSET: ON-GOING

## 2019-08-22 ASSESSMENT — PAIN - FUNCTIONAL ASSESSMENT: PAIN_FUNCTIONAL_ASSESSMENT: 0-10

## 2019-08-22 ASSESSMENT — PAIN DESCRIPTION - PAIN TYPE
TYPE: SURGICAL PAIN
TYPE: SURGICAL PAIN

## 2019-08-22 ASSESSMENT — PAIN DESCRIPTION - ORIENTATION
ORIENTATION: LEFT
ORIENTATION: LEFT

## 2019-08-22 ASSESSMENT — PAIN DESCRIPTION - DESCRIPTORS
DESCRIPTORS: THROBBING
DESCRIPTORS: THROBBING

## 2019-08-22 ASSESSMENT — PAIN DESCRIPTION - PROGRESSION
CLINICAL_PROGRESSION: GRADUALLY WORSENING
CLINICAL_PROGRESSION: GRADUALLY IMPROVING

## 2019-08-22 ASSESSMENT — PAIN DESCRIPTION - LOCATION
LOCATION: ARM
LOCATION: ARM

## 2019-08-22 NOTE — PROGRESS NOTES
O2 off pt breathes easy on RA.
Pt a&o. VSS. Pt denies pain and nausea. Pt in bed talking.
[place] notified of insulin pump, pt states she has received instructions from MD on pre op instructions  Regarding this.
your safety, please do not wear any jewelry or body piercing's on the day of surgery. All jewelry must be removed. If you have dentures, they will be removed before going to operating room. For your convenience, we will provide you with a container. If you wear contact lenses or glasses, they will be removed, please bring a case for them. If you have a living will and a durable power of  for healthcare, please bring in a copy. As part of our patient safety program to minimize surgical site infections, we ask you to do the following:    · Please notify your surgeon if you develop any illness between         now and the  day of your surgery. · This includes a cough, cold, fever, sore throat, nausea,         or vomiting, and diarrhea, etc.  ·  Please notify your surgeon if you experience dizziness, shortness         of breath or blurred vision between now and the time of your surgery. Do not shave your operative site 96 hours prior to surgery. For face and neck surgery, men may use an electric razor 48 hours   prior to surgery. You may shower the night before surgery or the morning of   your surgery with an antibacterial soap. You will need to bring a photo ID and insurance card    [facility] has an onsite pharmacy, would you like to utilize our pharmacy     If you will be staying overnight and use a C-pap machine, please bring   your C-pap to hospital     Our goal is to provide you with excellent care, therefore, visitors will be limited to two(2) in the room at a time so that we may focus on providing this care for you. Please contact pre-admission testing if you have any further questions. [facility] phone number:  [phone] PAT fax number:  [phone]  Please note these are generalized instructions for all surgical cases, you may be provided with more specific instructions according to your surgery.

## 2019-08-22 NOTE — ANESTHESIA PRE PROCEDURE
auscultation                             Cardiovascular:  Exercise tolerance: good (>4 METS),   (+) hypertension:, hyperlipidemia    (-) past MI and  angina      Rhythm: regular  Rate: normal                    Neuro/Psych:      (-) seizures, TIA and CVA           GI/Hepatic/Renal:        (-) GERD       Endo/Other:    (+) DiabetesType II DM, using insulin, .    (-) hypothyroidism               Abdominal:           Vascular:     - DVT and PE. Anesthesia Plan      general     ASA 3       Induction: intravenous. MIPS: Postoperative opioids intended and Prophylactic antiemetics administered. Anesthetic plan and risks discussed with patient. Plan discussed with CRNA. This pre-anesthesia assessment may be used as a history and physical.    DOS STAFF ADDENDUM:    Pt seen and examined, chart reviewed (including anesthesia, drug and allergy history). No interval changes to history and physical examination. Anesthetic plan, risks, benefits, alternatives, and personnel involved discussed with patient. Patient verbalized an understanding and agrees to proceed.       Almyra Brunner, MD  August 22, 2019  10:35 AM

## 2019-09-13 ENCOUNTER — OFFICE VISIT (OUTPATIENT)
Dept: ORTHOPEDIC SURGERY | Age: 69
End: 2019-09-13

## 2019-09-13 VITALS — WEIGHT: 162 LBS | RESPIRATION RATE: 18 BRPM | BODY MASS INDEX: 26.03 KG/M2 | HEIGHT: 66 IN

## 2019-09-13 DIAGNOSIS — G56.22 CUBITAL TUNNEL SYNDROME, LEFT: Primary | ICD-10-CM

## 2019-09-13 PROCEDURE — 99024 POSTOP FOLLOW-UP VISIT: CPT | Performed by: PHYSICIAN ASSISTANT

## 2019-09-13 PROCEDURE — APPNB15 APP NON BILLABLE TIME 0-15 MINS: Performed by: PHYSICIAN ASSISTANT

## 2019-09-18 ENCOUNTER — OFFICE VISIT (OUTPATIENT)
Dept: RHEUMATOLOGY | Age: 69
End: 2019-09-18
Payer: MEDICARE

## 2019-09-18 ENCOUNTER — TELEPHONE (OUTPATIENT)
Dept: RHEUMATOLOGY | Age: 69
End: 2019-09-18

## 2019-09-18 VITALS
HEART RATE: 88 BPM | WEIGHT: 155 LBS | SYSTOLIC BLOOD PRESSURE: 159 MMHG | DIASTOLIC BLOOD PRESSURE: 89 MMHG | HEIGHT: 66 IN | BODY MASS INDEX: 24.91 KG/M2

## 2019-09-18 DIAGNOSIS — M19.049 HAND ARTHRITIS: Primary | ICD-10-CM

## 2019-09-18 DIAGNOSIS — M19.049 HAND ARTHRITIS: ICD-10-CM

## 2019-09-18 DIAGNOSIS — G56.22 LESION OF LEFT ULNAR NERVE: ICD-10-CM

## 2019-09-18 LAB
A/G RATIO: 2 (ref 1.1–2.2)
ALBUMIN SERPL-MCNC: 4.3 G/DL (ref 3.4–5)
ALP BLD-CCNC: 71 U/L (ref 40–129)
ALT SERPL-CCNC: 8 U/L (ref 10–40)
ANION GAP SERPL CALCULATED.3IONS-SCNC: 13 MMOL/L (ref 3–16)
AST SERPL-CCNC: 14 U/L (ref 15–37)
BILIRUB SERPL-MCNC: 0.4 MG/DL (ref 0–1)
BUN BLDV-MCNC: 13 MG/DL (ref 7–20)
CALCIUM SERPL-MCNC: 9.4 MG/DL (ref 8.3–10.6)
CHLORIDE BLD-SCNC: 103 MMOL/L (ref 99–110)
CO2: 27 MMOL/L (ref 21–32)
CREAT SERPL-MCNC: 0.7 MG/DL (ref 0.6–1.2)
GFR AFRICAN AMERICAN: >60
GFR NON-AFRICAN AMERICAN: >60
GLOBULIN: 2.2 G/DL
GLUCOSE BLD-MCNC: 253 MG/DL (ref 70–99)
POTASSIUM SERPL-SCNC: 4.9 MMOL/L (ref 3.5–5.1)
SODIUM BLD-SCNC: 143 MMOL/L (ref 136–145)
TOTAL PROTEIN: 6.5 G/DL (ref 6.4–8.2)

## 2019-09-18 PROCEDURE — 3017F COLORECTAL CA SCREEN DOC REV: CPT | Performed by: INTERNAL MEDICINE

## 2019-09-18 PROCEDURE — G8400 PT W/DXA NO RESULTS DOC: HCPCS | Performed by: INTERNAL MEDICINE

## 2019-09-18 PROCEDURE — 1036F TOBACCO NON-USER: CPT | Performed by: INTERNAL MEDICINE

## 2019-09-18 PROCEDURE — 1123F ACP DISCUSS/DSCN MKR DOCD: CPT | Performed by: INTERNAL MEDICINE

## 2019-09-18 PROCEDURE — G8419 CALC BMI OUT NRM PARAM NOF/U: HCPCS | Performed by: INTERNAL MEDICINE

## 2019-09-18 PROCEDURE — 4040F PNEUMOC VAC/ADMIN/RCVD: CPT | Performed by: INTERNAL MEDICINE

## 2019-09-18 PROCEDURE — 99213 OFFICE O/P EST LOW 20 MIN: CPT | Performed by: INTERNAL MEDICINE

## 2019-09-18 PROCEDURE — 1090F PRES/ABSN URINE INCON ASSESS: CPT | Performed by: INTERNAL MEDICINE

## 2019-09-18 PROCEDURE — G8427 DOCREV CUR MEDS BY ELIG CLIN: HCPCS | Performed by: INTERNAL MEDICINE

## 2019-09-18 NOTE — PROGRESS NOTES
2019  Patient Name: Tad Beebe  : 1950  Medical Record: [id number]    MEDICATIONS  Current Outpatient Medications   Medication Sig Dispense Refill    naproxen (NAPROSYN) 500 MG tablet Take 1 tablet by mouth 2 times daily (with meals) 60 tablet 1    mycophenolate (CELLCEPT) 500 MG tablet Take 500 mg by mouth 2 times daily      EVISTA 60 MG tablet TAKE 1 TABLET AT BEDTIME 90 tablet 1    therapeutic multivitamin-minerals (THERAGRAN-M) tablet Take 1 tablet by mouth daily.  Insulin Lispro, Human, (INSULIN PUMP) SHAHZAD Inject  into the skin continuous.  atorvastatin (LIPITOR) 20 MG tablet Take 20 mg by mouth every evening       aspirin 81 MG tablet Take 81 mg by mouth daily. No current facility-administered medications for this visit. ALLERGIES  Allergies   Allergen Reactions    Amoxicillin-Pot Clavulanate Nausea Only    Sulfa Antibiotics Hives    Codeine Nausea And Vomiting         Comments  No specialty comments available. Background history:  Tad Beebe is a 71 y.o. female who is being seen for follow up evaluation of  bilateral hand pain. Symptoms started 3 days ago and is worse in the left hand mainly in the left third PIP joint. She has been on daily basis, 4 out of 10, aching, worse with using her hands without any significant relieving factors. She has noticed swelling in left third PIP joint. She has a morning stiffness lasting for few minutes. She has some difficulty opening jars. She denies any other joint pain or swelling or stiffness. She has tried over-the-counter Aleve and Advil with relief. She denies fever, weight loss, night sweats or swollen glands. Her grandmother had rheumatoid arthritis. She denies psoriasis, inflammatory bowel disease, inflammatory back pain, dactylitis, enthesitis, tenosynovitis and uveitis.   She had left hand x-ray which showed changes consistent with degenerative arthritis mainly in her DIP and PIP Date    Anxiety     DM (diabetes mellitus), type 1 ([facility] [address]     Hyperlipidemia     Hypertension     Osteoarthritis     Urinary incontinence      Past Surgical History:   Procedure Laterality Date    APPENDECTOMY  1968     SECTION  ,     2    COLONOSCOPY  2015    normal-ghastine    GALLBLADDER SURGERY  1996    KNEE ARTHROSCOPY Left     VT REPAIR MAJOR PERIPHERAL NERVE Left 2019    LEFT ULNAR NERVE DECOMPRESSION (AT ELBOW) performed by Joni Mckee MD at [address] Left 2019    LEFT ULNAR NERVE DECOMPRESSION (AT ELBOW) (Left )     Social History     Socioeconomic History    Marital status:      Spouse name: Not on file    Number of children: 2    Years of education: Not on file    Highest education level: Not on file   Occupational History    Occupation: Retired     Comment: elementary school   Social Needs    Financial resource strain: Not on file    Food insecurity:     Worry: Not on file     Inability: Not on file   Nerveda needs:     Medical: Not on file     Non-medical: Not on file   Tobacco Use    Smoking status: Never Smoker    Smokeless tobacco: Never Used   Substance and Sexual Activity    Alcohol use: No    Drug use: No    Sexual activity: Yes     Partners: Female, Male   Lifestyle    Physical activity:     Days per week: Not on file     Minutes per session: Not on file    Stress: Not on file   Relationships    Social connections:     Talks on phone: Not on file     Gets together: Not on file     Attends Sikh service: Not on file     Active member of club or organization: Not on file     Attends meetings of clubs or organizations: Not on file     Relationship status: Not on file    Intimate partner violence:     Fear of current or ex partner: Not on file     Emotionally abused: Not on file     Physically abused: Not on file     Forced sexual activity: Not on file   Other Topics Concern    Not on file Social History Narrative    Not on file     Family History   Problem Relation Age of Onset    Cancer Mother         Tigress leukemia    Colon Cancer Father     Heart Disease Maternal Grandmother     Heart Disease Maternal Grandfather     Heart Attack Maternal Grandfather     Arthritis Paternal Grandmother          PHYSICAL EXAM   Vitals:    09/18/19 1111   BP: (!) 159/89   Site: Right Upper Arm   Pulse: 88   Weight: 155 lb (70.3 kg)   Height: 5' 5.5\" (1.664 m)     Physical Exam  Constitutional:  Well developed, well nourished, no acute distress, non-toxic appearance   Musculoskeletal:    RIGHT  Swell  Tender  ROM  LEFT  Swell  Tender  ROM    DIP2  0  0  Heberden   0  0  Heberden    DIP3  0  0  Heberden   0  0  Heberden    DIP4  0  0  Heberden   0  0  Heberden    DIP5  0  0  Heberden   0  0  Heberden    PIP1  0  0  Bony change   0  0  Bony change    PIP2  0 0 Bony change   0 0 Bony change    PIP3  0 0 Bony change   0 0 Bony change    PIP4  0  0  FULL   0  0  FULL    PIP5  0  0  FULL   0  0  FULL    MCP1  0  0  FULL   0  0  FULL    MCP2  0  0  FULL   0  0  FULL    MCP3  0  0  FULL   0  0  FULL    MCP4  0  0  FULL   0  0  FULL    MCP5  0  0  FULL   0  0  FULL    Wrist  0  0  FULL   0  0  FULL    Elbow  0  0  FULL   0  0  FULL    Shouldr  0  0  FULL   0  0  FULL    Hip  0  0  FULL   0  0  FULL    Knee  0  0  FULL   0  0  FULL    Ankle  0  0  FULL   0  0  FULL    MTP1  0  0  FULL   0  0  FULL    MTP2  0  0  FULL   0  0  FULL    MTP3  0  0  FULL   0  0  FULL    MTP4  0  0  FULL   0  0  FULL    MTP5  0  0  FULL   0  0  FULL    IP1  0  0  FULL   0  0  FULL    IP2  0  0  FULL   0  0  FULL    IP3  0  0  FULL   0  0  FULL    IP4  0  0  FULL   0  0  FULL    IP5  0  0  FULL   0 0 FULL     Bony enlargement and squaring of bilateral CMC joints. Ambulates without assistance, normal gait  Neck: Full ROM, no tenderness,supple   Back- no tenderness.   Eyes:  PERRL, extra ocular movements intact, conjunctiva normal   HEENT: unintentional computerized transcription errors may be present.

## 2019-12-30 ENCOUNTER — HOSPITAL ENCOUNTER (OUTPATIENT)
Dept: CT IMAGING | Age: 69
Discharge: HOME | End: 2019-12-30
Payer: MEDICARE

## 2019-12-30 DIAGNOSIS — R51.9 FACIAL PAIN: ICD-10-CM

## 2019-12-30 DIAGNOSIS — S09.90XA TRAUMATIC INJURY OF HEAD, INITIAL ENCOUNTER: ICD-10-CM

## 2019-12-30 PROCEDURE — 70486 CT MAXILLOFACIAL W/O DYE: CPT

## 2019-12-30 PROCEDURE — 70450 CT HEAD/BRAIN W/O DYE: CPT

## 2020-03-18 ENCOUNTER — OFFICE VISIT (OUTPATIENT)
Dept: RHEUMATOLOGY | Age: 70
End: 2020-03-18
Payer: MEDICARE

## 2020-03-18 VITALS
WEIGHT: 160 LBS | DIASTOLIC BLOOD PRESSURE: 69 MMHG | TEMPERATURE: 95.9 F | SYSTOLIC BLOOD PRESSURE: 123 MMHG | BODY MASS INDEX: 26.22 KG/M2 | HEART RATE: 96 BPM

## 2020-03-18 LAB
A/G RATIO: 1.9 (ref 1.1–2.2)
ALBUMIN SERPL-MCNC: 4.1 G/DL (ref 3.4–5)
ALP BLD-CCNC: 70 U/L (ref 40–129)
ALT SERPL-CCNC: 11 U/L (ref 10–40)
ANION GAP SERPL CALCULATED.3IONS-SCNC: 11 MMOL/L (ref 3–16)
AST SERPL-CCNC: 18 U/L (ref 15–37)
BASOPHILS ABSOLUTE: 0.1 K/UL (ref 0–0.2)
BASOPHILS RELATIVE PERCENT: 0.9 %
BILIRUB SERPL-MCNC: 0.4 MG/DL (ref 0–1)
BUN BLDV-MCNC: 14 MG/DL (ref 7–20)
CALCIUM SERPL-MCNC: 9.5 MG/DL (ref 8.3–10.6)
CHLORIDE BLD-SCNC: 104 MMOL/L (ref 99–110)
CO2: 29 MMOL/L (ref 21–32)
CREAT SERPL-MCNC: 0.7 MG/DL (ref 0.6–1.2)
EOSINOPHILS ABSOLUTE: 0.1 K/UL (ref 0–0.6)
EOSINOPHILS RELATIVE PERCENT: 1.2 %
GFR AFRICAN AMERICAN: >60
GFR NON-AFRICAN AMERICAN: >60
GLOBULIN: 2.2 G/DL
GLUCOSE BLD-MCNC: 96 MG/DL (ref 70–99)
HCT VFR BLD CALC: 44.9 % (ref 36–48)
HEMOGLOBIN: 14.3 G/DL (ref 12–16)
LYMPHOCYTES ABSOLUTE: 2 K/UL (ref 1–5.1)
LYMPHOCYTES RELATIVE PERCENT: 25 %
MCH RBC QN AUTO: 29.8 PG (ref 26–34)
MCHC RBC AUTO-ENTMCNC: 31.8 G/DL (ref 31–36)
MCV RBC AUTO: 93.7 FL (ref 80–100)
MONOCYTES ABSOLUTE: 0.8 K/UL (ref 0–1.3)
MONOCYTES RELATIVE PERCENT: 9.7 %
NEUTROPHILS ABSOLUTE: 5 K/UL (ref 1.7–7.7)
NEUTROPHILS RELATIVE PERCENT: 63.2 %
PDW BLD-RTO: 13.5 % (ref 12.4–15.4)
PLATELET # BLD: 331 K/UL (ref 135–450)
PMV BLD AUTO: 9.1 FL (ref 5–10.5)
POTASSIUM SERPL-SCNC: 4.7 MMOL/L (ref 3.5–5.1)
RBC # BLD: 4.8 M/UL (ref 4–5.2)
SODIUM BLD-SCNC: 144 MMOL/L (ref 136–145)
TOTAL PROTEIN: 6.3 G/DL (ref 6.4–8.2)
WBC # BLD: 7.9 K/UL (ref 4–11)

## 2020-03-18 PROCEDURE — 99213 OFFICE O/P EST LOW 20 MIN: CPT | Performed by: INTERNAL MEDICINE

## 2020-03-18 RX ORDER — NAPROXEN 500 MG/1
500 TABLET ORAL 2 TIMES DAILY WITH MEALS
Qty: 60 TABLET | Refills: 5 | Status: SHIPPED | OUTPATIENT
Start: 2020-03-18 | End: 2022-03-17 | Stop reason: SDUPTHER

## 2020-03-18 NOTE — PATIENT INSTRUCTIONS
Q282 in the [facility] box to learn more about \"Hand Arthritis: Exercises. \"     If you do not have an account, please click on the \"Sign Up Now\" link. Current as of: June 26, 2019Content Version: 12.4  © 1116-2424 [employer]. Care instructions adapted under license by [facility] ([facility]). If you have questions about a medical condition or this instruction, always ask your healthcare professional. [address] any warranty or liability for your use of this information.

## 2020-03-18 NOTE — PROGRESS NOTES
patients Past medical History, Social History and Family History as mentioned in her chart and this remains unchanged fromprevious.     Past Medical History:   Diagnosis Date    Anxiety     DM (diabetes mellitus), type 1 ([address]     Hyperlipidemia     Hypertension     Osteoarthritis     Urinary incontinence      Past Surgical History:   Procedure Laterality Date    APPENDECTOMY  1968     SECTION  ,     2    COLONOSCOPY  2015    normal-ghastine    GALLBLADDER SURGERY  1996    KNEE ARTHROSCOPY Left     ME REPAIR MAJOR PERIPHERAL NERVE Left 2019    LEFT ULNAR NERVE DECOMPRESSION (AT ELBOW) performed by Tyler Chen MD at [address] Left 2019    LEFT ULNAR NERVE DECOMPRESSION (AT ELBOW) (Left )     Social History     Socioeconomic History    Marital status:      Spouse name: Not on file    Number of children: 2    Years of education: Not on file    Highest education level: Not on file   Occupational History    Occupation: Retired     Comment: elementary school   Social Needs    Financial resource strain: Not hard at all   Britt-Almaz insecurity     Worry: Never true     Inability: Never true    Transportation needs     Medical: No     Non-medical: No   Tobacco Use    Smoking status: Never Smoker    Smokeless tobacco: Never Used   Substance and Sexual Activity    Alcohol use: No    Drug use: No    Sexual activity: Yes     Partners: Female, Male   Lifestyle    Physical activity     Days per week: Not on file     Minutes per session: Not on file    Stress: Not on file   Relationships    Social connections     Talks on phone: Not on file     Gets together: Not on file     Attends Synagogue service: Not on file     Active member of club or organization: Not on file     Attends meetings of clubs or organizations: Not on file     Relationship status: Not on file    Intimate partner violence     Fear of current or ex partner: Not on file Emotionally abused: Not on file     Physically abused: Not on file     Forced sexual activity: Not on file   Other Topics Concern    Not on file   Social History Narrative    Not on file     Family History   Problem Relation Age of Onset    Cancer Mother         Tigress leukemia    Colon Cancer Father     Heart Disease Maternal Grandmother     Heart Disease Maternal Grandfather     Heart Attack Maternal Grandfather     Arthritis Paternal Grandmother          PHYSICAL EXAM   Vitals:    03/18/20 1031   BP: 123/69   Site: Right Upper Arm   Position: Sitting   Cuff Size: Medium Adult   Pulse: 96   Temp: 95.9 °F (35.5 °C)   TempSrc: Oral   Weight: 160 lb (72.6 kg)     Physical Exam  Constitutional:  Well developed, well nourished, no acute distress, non-toxic appearance   Musculoskeletal:    RIGHT  Swell  Tender  ROM  LEFT  Swell  Tender  ROM    DIP2  0  0  Heberden   0  0  Heberden    DIP3  0  0  Heberden   0  0  Heberden    DIP4  0  0  Heberden   0  0  Heberden    DIP5  0  0  Heberden   0  0  Heberden    PIP1  0  0  Bony change   0  0  Bony change    PIP2  0 0 Bony change   0 0 Bony change    PIP3  0 0 Bony change   0 0 Bony change    PIP4  0  0  FULL   0  0  FULL    PIP5  0  0  FULL   0  0  FULL    MCP1  0  0  FULL   0  0  FULL    MCP2  0  0  FULL   0  0  FULL    MCP3  0  0  FULL   0  0  FULL    MCP4  0  0  FULL   0  0  FULL    MCP5  0  0  FULL   0  0  FULL    Wrist  0  0  FULL   0  0  FULL    Elbow  0  0  FULL   0  0  FULL    Shouldr  0  0  FULL   0  0  FULL    Hip  0  0  FULL   0  0  FULL    Knee  0  0  FULL   0  0  FULL    Ankle  0  0  FULL   0  0  FULL    MTP1  0  0  FULL   0  0  FULL    MTP2  0  0  FULL   0  0  FULL    MTP3  0  0  FULL   0  0  FULL    MTP4  0  0  FULL   0  0  FULL    MTP5  0  0  FULL   0  0  FULL    IP1  0  0  FULL   0  0  FULL    IP2  0  0  FULL   0  0  FULL    IP3  0  0  FULL   0  0  FULL    IP4  0  0  FULL   0  0  FULL    IP5  0  0  FULL   0 0 FULL     Bony enlargement and squaring of bilateral CMC joints. Ambulates without assistance, normal gait  Neck: Full ROM, no tenderness,supple   Back- no tenderness. Eyes:  PERRL, extra ocular movements intact, conjunctiva normal   HEENT:  Atraumatic, normocephalic, external ears normal, oropharynx moist, no pharyngeal exudates. Respiratory:  No respiratory distress  GI:  Soft, nondistended, normal bowel sounds, nontender, noorganomegaly, no mass, no rebound, no guarding   :  No costovertebral angle tenderness   Integument:  Well hydrated, no rash or telangiectasias  Lymphatic:  No lymphadenopathy noted   Neurologic:   Alert & oriented x 3, CN 2-12 normal, no focal deficits noted. Sensations Intact. Muscle strength 5/5 proximallyand distally in upper and lower extremities.    Psychiatric:  Speech and behavior appropriate           LABS AND IMAGING  Outside data reviewed and in HPI    Lab Results   Component Value Date    WBC 6.6 07/06/2011    RBC 4.55 07/06/2011    HGB 14.0 07/06/2011    HCT 43.2 07/06/2011     07/06/2011    MCV 94.8 07/06/2011    MCH 30.8 07/06/2011    MCHC 32.5 07/06/2011    RDW 14.1 07/06/2011    SEGSPCT 50.9 07/06/2011    LYMPHOPCT 38.7 07/06/2011    MONOPCT 8.2 07/06/2011    EOSPCT 1.7 07/06/2011    BASOPCT 0.5 07/06/2011    MONOSABS 541 07/06/2011    LYMPHSABS 2,554 07/06/2011    EOSABS 112 07/06/2011    BASOSABS 33 07/06/2011       Chemistry        Component Value Date/Time     09/18/2019 1201    K 4.9 09/18/2019 1201     09/18/2019 1201    CO2 27 09/18/2019 1201    BUN 13 09/18/2019 1201    CREATININE 0.7 09/18/2019 1201        Component Value Date/Time    CALCIUM 9.4 09/18/2019 1201    ALKPHOS 71 09/18/2019 1201    AST 14 (L) 09/18/2019 1201    ALT 8 (L) 09/18/2019 1201    BILITOT 0.4 09/18/2019 1201          Lab Results   Component Value Date    SEDRATE 5 05/22/2019     Lab Results   Component Value Date    CRP 1.6 05/22/2019     Lab Results   Component Value Date    ARIELA Negative 05/22/2019     Lab Results Component Value Date    RF 14.0 05/22/2019    CCPABIGG 2 05/22/2019     Lab Results   Component Value Date    ARIELA Negative 05/22/2019     No results found for: DSDNAG, DSDNAIGGIFA  No results found for: SSAROAB, SSALAAB  No results found for: SMAB, RNPAB  No results found for: CENTABIGG  No results found for: C3, C4, ACE  No results found for: Juan R Bleak, [id number]  No results found for: Arlin Bevels  No results found for: Garfield Bergerch  Lab Results   Component Value Date    TSH 3.98 07/06/2011     No results found for: VITD25    Radiology:    Left hand X-ray images were reviewed independently-degenerative arthritis involving DIP and PIP joints with SEA GULL appearance consistent with erosive osteoarthritis. No evidence of rheumatoid arthritis    ASSESSMENT AND PLAN      Assessment/Plan:      ASSESSMENT:    1. Erosive osteoarthritis of both hands        PLAN:   1. Erosive osteoarthritis of both hands  Bilateral hand x-rays with erosive osteoarthritis. RF 14, CCP negative, normal ESR and CRP  -Unable to take naproxen daily [bleeds easily]  Advised take naproxen as needed  Diclofenac gel as needed  Tylenol 650 mg every 6 hours, do not exceed 3 grams daily   -Paraffin wax bath  Continue hand exercises-    - CBC Auto Differential; Future  - Comprehensive Metabolic Panel; Future  - diclofenac sodium (VOLTAREN) 1 % GEL; Apply 4 g topically 4 times daily  Dispense: 1 Tube; Refill: 1  - naproxen (NAPROSYN) 500 MG tablet; Take 1 tablet by mouth 2 times daily (with meals)  Dispense: 60 tablet; Refill: 5    The patient indicates understanding of these issues and agrees with the plan. Return in about 6 months (around 9/18/2020). The risks and benefits of my recommendations, as well as other treatment options, benefits and side effects werediscussed with the patient. All questions were answered.        ######################################################################    I thank you for giving me theopportunity to participate in [facility]. If you have any questions or concerns please feel free to contact me. I look forward to following  Radha Angel along with you. Electronically signed by: Aarti Barajas MD, 3/18/2020 11:07 AM    Documentation was done using voice recognition dragon software. Every effort was made to ensure accuracy;however, inadvertent unintentional computerized transcription errors may be present.

## 2020-03-19 ENCOUNTER — TELEPHONE (OUTPATIENT)
Dept: RHEUMATOLOGY | Age: 70
End: 2020-03-19

## 2020-03-19 NOTE — TELEPHONE ENCOUNTER
----- Message from Aron Oglesby MD sent at 3/19/2020 12:53 PM EDT -----  Please call the patient and let Her know that Her labs are normal.

## 2020-09-16 ENCOUNTER — VIRTUAL VISIT (OUTPATIENT)
Dept: RHEUMATOLOGY | Age: 70
End: 2020-09-16
Payer: MEDICARE

## 2020-09-16 PROCEDURE — 99213 OFFICE O/P EST LOW 20 MIN: CPT | Performed by: INTERNAL MEDICINE

## 2020-09-16 NOTE — PROGRESS NOTES
2020   Sharlene Cardoso is a 79 y.o. female being evaluated by a Virtual Visit (video visit) encounter to address concerns as mentioned above. A caregiver was present when appropriate. Due to this being a TeleHealth encounter (During FSMIT-58 public health emergency), evaluation of the following organ systems was limited: Vitals/Constitutional/EENT/Resp/CV/GI//MS/Neuro/Skin/Heme-Lymph-Imm. Pursuant to the emergency declaration under the [address] [place] [address] and the [employer] Act, this Virtual Visit was conducted with patient's (and/or legal guardian's) consent, to reduce the patient's risk of exposure to COVID-19 and provide necessary medical care. The patient (and/or legal guardian) has also been advised to contact this office for worsening conditions or problems, and seek emergency medical treatment and/or call 911 if deemed necessary. Patient identification was verified at the start of the visit: Yes    Total time spent for this encounter: Not billed by time    Services were provided through a video synchronous discussion virtually to substitute for in-person clinic visit. Patient and provider were located at their individual homes. --Tricia Akhtar MD on 2020 at 3:02 PM    An electronic signature was used to authenticate this note.   Patient Name: Sharlene Cardoso  : 1950  Medical Record: [id number]    MEDICATIONS  Current Outpatient Medications   Medication Sig Dispense Refill    diclofenac sodium (VOLTAREN) 1 % GEL Apply 4 g topically 4 times daily 1 Tube 1    naproxen (NAPROSYN) 500 MG tablet Take 1 tablet by mouth 2 times daily (with meals) 60 tablet 5    mycophenolate (CELLCEPT) 500 MG tablet Take 500 mg by mouth 2 times daily      EVISTA 60 MG tablet TAKE 1 TABLET AT BEDTIME 90 tablet 1    therapeutic multivitamin-minerals (THERAGRAN-M) tablet Take 1 tablet by mouth daily.        Insulin Lispro, Human, (INSULIN PUMP) SHAHZAD Inject  into the skin continuous.  aspirin 81 MG tablet Take 81 mg by mouth as needed 3 times a week      atorvastatin (LIPITOR) 20 MG tablet Take 20 mg by mouth every evening        No current facility-administered medications for this visit. ALLERGIES  Allergies   Allergen Reactions    Amoxicillin-Pot Clavulanate Nausea Only    Imuran [Azathioprine] Other (See Comments)     Causes liver enzymes to increase     Sulfa Antibiotics Hives    Codeine Nausea And Vomiting         Comments  No specialty comments available. Background history:  Marge Baca is a 79 y.o. female who is being seen for follow up evaluation of  bilateral hand pain. Symptoms started 3 days ago and is worse in the left hand mainly in the left third PIP joint. She has been on daily basis, 4 out of 10, aching, worse with using her hands without any significant relieving factors. She has noticed swelling in left third PIP joint. She has a morning stiffness lasting for few minutes. She has some difficulty opening jars. She denies any other joint pain or swelling or stiffness. She has tried over-the-counter Aleve and Advil with relief. She denies fever, weight loss, night sweats or swollen glands. Her grandmother had rheumatoid arthritis. She denies psoriasis, inflammatory bowel disease, inflammatory back pain, dactylitis, enthesitis, tenosynovitis and uveitis. She had left hand x-ray which showed changes consistent with degenerative arthritis mainly in her DIP and PIP joints. Interim history:. She presents for a follow-up of erosive osteoarthritis. She takes naproxen 500 mg daily and uses diclofenac gel as needed. Hand pain improves with naproxen. She denies any recent joint flareups. She denies any side effects with the medications. Blood work shows positive RF 14, CCP negative, normal ESR and CRP. Hand x-rays with erosive osteoarthritis. HPI  Review of Systems    REVIEW OF SYSTEMS:   Constitutional: No unanticipated weight loss or fevers. No fatigue and malaise. Integumentary: No rash, photosensitivity, malar rash, livedo reticularis, alopecia and Raynaud's symptoms, sclerodactyly, skin tightening  Eyes: negative for visual disturbance and persistent redness, discharge from eyes   ENT: - No tinnitus, loss of hearing, vertigo, or recurrent ear infections.  - No history of nasal/oral ulcers. - No history of dry eyes/dry mouth  Cardiovascular: No history of pericarditis, chest pain or murmur or palpitations  Respiratory: No shortness of breath, cough or history of interstitial lung disease. No history of pleurisy. No history of tuberculosis or atypical infections. Gastrointestinal: No history of heart burn, dysphagia or esophageal dysmotility. No change in bowel habits or any inflammatory bowel disease. Genitourinary: No history renal disease, miscarriages. Hematologic/Lymphatic: No abnormal bruising or bleeding, blood clots or swollen lymph nodes. Neurological: No history of headaches, seizure or focal weakness. No history of neuropathies, paresthesias or hyperesthesias, facial droop, diplopia  Psychiatric: No history of bipolar disease, depression  Endocrine: Denies any polyuria, polydipsia and osteoporosis  Allergic/Immunologic: No nasal congestion or hives. I have reviewed patients Past medical History, Social History and Family History as mentioned in her chart and this remains unchanged fromprevious.     Past Medical History:   Diagnosis Date    Anxiety     DM (diabetes mellitus), type 1 ([facility] [address]     Hyperlipidemia     Hypertension     Osteoarthritis     Urinary incontinence      Past Surgical History:   Procedure Laterality Date    APPENDECTOMY  [address]   [address]    2    COLONOSCOPY  4/23/2015    normal-ghastine    GALLBLADDER SURGERY  1996    KNEE ARTHROSCOPY Left 2001    FL REPAIR MAJOR PERIPHERAL NERVE Left 8/22/2019    LEFT ULNAR NERVE DECOMPRESSION (AT ELBOW) performed by Alexy Ramirez MD at [address] Left 08/22/2019    LEFT ULNAR NERVE DECOMPRESSION (AT ELBOW) (Left )     Social History     Socioeconomic History    Marital status:      Spouse name: Not on file    Number of children: 2    Years of education: Not on file    Highest education level: Not on file   Occupational History    Occupation: Retired     Comment: elementary school   Social Needs    Financial resource strain: Not hard at all   Britt-Almaz insecurity     Worry: Never true     Inability: Never true   [employer] needs     Medical: No     Non-medical: No   Tobacco Use    Smoking status: Never Smoker    Smokeless tobacco: Never Used   Substance and Sexual Activity    Alcohol use: No    Drug use: No    Sexual activity: Yes     Partners: Female, Male   Lifestyle    Physical activity     Days per week: Not on file     Minutes per session: Not on file    Stress: Not on file   Relationships    Social connections     Talks on phone: Not on file     Gets together: Not on file     Attends Congregation service: Not on file     Active member of club or organization: Not on file     Attends meetings of clubs or organizations: Not on file     Relationship status: Not on file    Intimate partner violence     Fear of current or ex partner: Not on file     Emotionally abused: Not on file     Physically abused: Not on file     Forced sexual activity: Not on file   Other Topics Concern    Not on file   Social History Narrative    Not on file     Family History   Problem Relation Age of Onset    Cancer Mother         Tigress leukemia    Colon Cancer Father     Heart Disease Maternal Grandmother     Heart Disease Maternal Grandfather     Heart Attack Maternal Grandfather     Arthritis Paternal Grandmother          PHYSICAL EXAMINATION:  [ INSTRUCTIONS:  \"[x]\" Indicates a positive item  \"[]\" Indicates a negative item  -- DELETE ALL ITEMS NOT EXAMINED]  Vital Signs: (As obtained by patient/caregiver or practitioner observation)    Blood pressure-  Heart rate-    Respiratory rate-    Temperature-  Pulse oximetry-     Constitutional: [x] Appears well-developed and well-nourished [x] No apparent distress      [] Abnormal-   Mental status  [x] Alert and awake  [x] Oriented to person/place/time []Able to follow commands      Eyes:  EOM    [x]  Normal  [] Abnormal-  Sclera  [x]  Normal  [] Abnormal -         Discharge [x]  None visible  [] Abnormal -    HENT:   [x] Normocephalic, atraumatic.   [] Abnormal   [x] Mouth/Throat: Mucous membranes are moist.     External Ears [x] Normal  [] Abnormal-     Neck: [x] No visualized mass     Pulmonary/Chest: [x] Respiratory effort normal.  [x] No visualized signs of difficulty breathing or respiratory distress        [] Abnormal-      Musculoskeletal:   [x] Normal gait with no signs of ataxia         [x] Normal range of motion of neck        [] Abnormal-       Neurological:        [x] No Facial Asymmetry (Cranial nerve 7 motor function) (limited exam to video visit)          [] No gaze palsy        [] Abnormal-         Skin:        [x] No significant exanthematous lesions or discoloration noted on facial skin         [] Abnormal-            Psychiatric:       [x] Normal Affect [x] No Hallucinations        [] Abnormal-         LABS AND IMAGING  Outside data reviewed and in HPI    Lab Results   Component Value Date    WBC 7.9 03/18/2020    RBC 4.80 03/18/2020    HGB 14.3 03/18/2020    HCT 44.9 03/18/2020     03/18/2020    MCV 93.7 03/18/2020    MCH 29.8 03/18/2020    MCHC 31.8 03/18/2020    RDW 13.5 03/18/2020    SEGSPCT 50.9 07/06/2011    LYMPHOPCT 25.0 03/18/2020    MONOPCT 9.7 03/18/2020    EOSPCT 1.7 07/06/2011    BASOPCT 0.9 03/18/2020    MONOSABS 0.8 03/18/2020    LYMPHSABS 2.0 03/18/2020    EOSABS 0.1 03/18/2020    BASOSABS 0.1 03/18/2020       Chemistry        Component Value and side effects werediscussed with the patient. All questions were answered. ######################################################################    I thank you for giving me theopportunity to participate in [facility]. If you have any questions or concerns please feel free to contact me. I look forward to following  Phoebe Fuller along with you. Electronically signed by: Lise Medel MD, 9/16/2020 3:02 PM    Documentation was done using voice recognition dragon software. Every effort was made to ensure accuracy;however, inadvertent unintentional computerized transcription errors may be present.

## 2020-09-17 DIAGNOSIS — M15.4 EROSIVE OSTEOARTHRITIS OF BOTH HANDS: ICD-10-CM

## 2020-09-17 LAB
A/G RATIO: 2.1 (ref 1.1–2.2)
ALBUMIN SERPL-MCNC: 4 G/DL (ref 3.4–5)
ALP BLD-CCNC: 88 U/L (ref 40–129)
ALT SERPL-CCNC: 14 U/L (ref 10–40)
ANION GAP SERPL CALCULATED.3IONS-SCNC: 11 MMOL/L (ref 3–16)
AST SERPL-CCNC: 17 U/L (ref 15–37)
AVERAGE GLUCOSE: NORMAL
BILIRUB SERPL-MCNC: 0.5 MG/DL (ref 0–1)
BUN BLDV-MCNC: 16 MG/DL (ref 7–20)
CALCIUM SERPL-MCNC: 9.4 MG/DL (ref 8.3–10.6)
CHLORIDE BLD-SCNC: 102 MMOL/L (ref 99–110)
CO2: 29 MMOL/L (ref 21–32)
CREAT SERPL-MCNC: 0.7 MG/DL (ref 0.6–1.2)
GFR AFRICAN AMERICAN: >60
GFR NON-AFRICAN AMERICAN: >60
GLOBULIN: 1.9 G/DL
GLUCOSE BLD-MCNC: 335 MG/DL (ref 70–99)
HBA1C MFR BLD: 6.9 %
POTASSIUM SERPL-SCNC: 4.5 MMOL/L (ref 3.5–5.1)
SODIUM BLD-SCNC: 142 MMOL/L (ref 136–145)
TOTAL PROTEIN: 5.9 G/DL (ref 6.4–8.2)

## 2020-09-29 ENCOUNTER — TELEPHONE (OUTPATIENT)
Dept: RHEUMATOLOGY | Age: 70
End: 2020-09-29

## 2020-09-29 NOTE — TELEPHONE ENCOUNTER
PA SUBMITTED VIA [facility] FOR Diclofenac Sodium 1% gel  Key: [id number] - PA Case ID: [id number]   Approvedtoday   Your request has been approved  Thru Anne-Marie ID: [id number]

## 2021-02-18 ENCOUNTER — HOSPITAL ENCOUNTER (OUTPATIENT)
Dept: WOMENS IMAGING | Age: 71
Discharge: HOME | End: 2021-02-18
Payer: MEDICARE

## 2021-02-18 DIAGNOSIS — Z12.31 VISIT FOR SCREENING MAMMOGRAM: ICD-10-CM

## 2021-02-18 PROCEDURE — 77067 SCR MAMMO BI INCL CAD: CPT

## 2021-03-18 ENCOUNTER — VIRTUAL VISIT (OUTPATIENT)
Dept: RHEUMATOLOGY | Age: 71
End: 2021-03-18
Payer: MEDICARE

## 2021-03-18 DIAGNOSIS — M15.4 EROSIVE OSTEOARTHRITIS OF BOTH HANDS: Primary | ICD-10-CM

## 2021-03-18 PROCEDURE — 99214 OFFICE O/P EST MOD 30 MIN: CPT | Performed by: INTERNAL MEDICINE

## 2021-03-18 RX ORDER — PREDNISONE 1 MG/1
TABLET ORAL
Qty: 30 TABLET | Refills: 0 | Status: SHIPPED
Start: 2021-03-18 | End: 2021-06-10 | Stop reason: CLARIF

## 2021-03-18 NOTE — PROGRESS NOTES
3/18/2021   Shaquille Raymond is a 79 y.o. female being evaluated by a Virtual Visit (video visit) encounter to address concerns as mentioned above. A caregiver was present when appropriate. Due to this being a TeleHealth encounter (During JYYTB-61 public health emergency), evaluation of the following organ systems was limited: Vitals/Constitutional/EENT/Resp/CV/GI//MS/Neuro/Skin/Heme-Lymph-Imm. Pursuant to the emergency declaration under the [address] [place] [address], [address] [place] [address] authority and the [employer] Act, this Virtual Visit was conducted with patient's (and/or legal guardian's) consent, to reduce the patient's risk of exposure to COVID-19 and provide necessary medical care. The patient (and/or legal guardian) has also been advised to contact this office for worsening conditions or problems, and seek emergency medical treatment and/or call 911 if deemed necessary. Patient identification was verified at the start of the visit: Yes    Total time spent for this encounter: Not billed by time    Services were provided through a video synchronous discussion virtually to substitute for in-person clinic visit. Patient and provider were located at their individual homes. --Danay Holguin MD on 3/18/2021 at 10:14 AM    An electronic signature was used to authenticate this note.   Patient Name: Shaquille Raymond  : 1950  Medical Record: [id number]    MEDICATIONS  Current Outpatient Medications   Medication Sig Dispense Refill    insulin aspart (NOVOLOG) 100 UNIT/ML injection vial Inject 125 Units into the skin daily      predniSONE (DELTASONE) 5 MG tablet Take 4 tabs daily for 3 days, 3 tabs daily for 3 days, 2 tabs daily for 3 days, 1 tab daily for 3 days and stop 30 tablet 0    diclofenac sodium (VOLTAREN) 1 % GEL Apply 4 g topically 4 times daily 1 Tube 1    naproxen (NAPROSYN) 500 MG tablet Take 1 tablet by mouth 2 times daily (with meals) 60 tablet 5    EVISTA 60 MG tablet TAKE 1 TABLET AT BEDTIME 90 tablet 1    therapeutic multivitamin-minerals (THERAGRAN-M) tablet Take 1 tablet by mouth daily.  Insulin Lispro, Human, (INSULIN PUMP) SHAHZAD Inject  into the skin continuous.  aspirin 81 MG tablet Take 81 mg by mouth as needed 3 times a week      atorvastatin (LIPITOR) 20 MG tablet Take 20 mg by mouth every evening       mycophenolate (CELLCEPT) 500 MG tablet Take 500 mg by mouth 2 times daily       No current facility-administered medications for this visit. ALLERGIES  Allergies   Allergen Reactions    Amoxicillin-Pot Clavulanate Nausea Only    Imuran [Azathioprine] Other (See Comments)     Causes liver enzymes to increase     Sulfa Antibiotics Hives    Codeine Nausea And Vomiting         Comments  No specialty comments available. Background history:  Merlene Gardner is a 79 y.o. female who is being seen for follow up evaluation of  bilateral hand pain. Symptoms started 3 days ago and is worse in the left hand mainly in the left third PIP joint. She has been on daily basis, 4 out of 10, aching, worse with using her hands without any significant relieving factors. She has noticed swelling in left third PIP joint. She has a morning stiffness lasting for few minutes. She has some difficulty opening jars. She denies any other joint pain or swelling or stiffness. She has tried over-the-counter Aleve and Advil with relief. She denies fever, weight loss, night sweats or swollen glands. Her grandmother had rheumatoid arthritis. She denies psoriasis, inflammatory bowel disease, inflammatory back pain, dactylitis, enthesitis, tenosynovitis and uveitis. She had left hand x-ray which showed changes consistent with degenerative arthritis mainly in her DIP and PIP joints. Interim history:. She presents for a follow-up of erosive osteoarthritis.   She takes naproxen 500 mg daily and uses and Family History as mentioned in her chart and this remains unchanged fromprevious.     Past Medical History:   Diagnosis Date    Anxiety     DM (diabetes mellitus), type 1 ([address]     Hyperlipidemia     Hypertension     Osteoarthritis     Urinary incontinence      Past Surgical History:   Procedure Laterality Date    APPENDECTOMY  1968     SECTION  ,     2    COLONOSCOPY  2015    normal-ghastine    GALLBLADDER SURGERY  1996    KNEE ARTHROSCOPY Left     DC REPAIR MAJOR PERIPHERAL NERVE Left 2019    LEFT ULNAR NERVE DECOMPRESSION (AT ELBOW) performed by Loring Gaucher, MD at [address] Left 2019    LEFT ULNAR NERVE DECOMPRESSION (AT ELBOW) (Left )     Social History     Socioeconomic History    Marital status:      Spouse name: Not on file    Number of children: 2    Years of education: Not on file    Highest education level: Not on file   Occupational History    Occupation: Retired     Comment: elementary school   Social Needs    Financial resource strain: Not hard at all   Britt-Almaz insecurity     Worry: Never true     Inability: Never true    Transportation needs     Medical: No     Non-medical: No   Tobacco Use    Smoking status: Never Smoker    Smokeless tobacco: Never Used   Substance and Sexual Activity    Alcohol use: No    Drug use: No    Sexual activity: Yes     Partners: Female, Male   Lifestyle    Physical activity     Days per week: Not on file     Minutes per session: Not on file    Stress: Not on file   Relationships    Social connections     Talks on phone: Not on file     Gets together: Not on file     Attends [employer] service: Not on file     Active member of club or organization: Not on file     Attends meetings of clubs or organizations: Not on file     Relationship status: Not on file    Intimate partner violence     Fear of current or ex partner: Not on file     Emotionally abused: Not on file Normal Affect [x] No Hallucinations        [] Abnormal-         LABS AND IMAGING  Outside data reviewed and in HPI    Lab Results   Component Value Date    WBC 7.9 03/18/2020    RBC 4.80 03/18/2020    HGB 14.3 03/18/2020    HCT 44.9 03/18/2020     03/18/2020    MCV 93.7 03/18/2020    MCH 29.8 03/18/2020    MCHC 31.8 03/18/2020    RDW 13.5 03/18/2020    SEGSPCT 50.9 07/06/2011    LYMPHOPCT 25.0 03/18/2020    MONOPCT 9.7 03/18/2020    EOSPCT 1.7 07/06/2011    BASOPCT 0.9 03/18/2020    MONOSABS 0.8 03/18/2020    LYMPHSABS 2.0 03/18/2020    EOSABS 0.1 03/18/2020    BASOSABS 0.1 03/18/2020       Chemistry        Component Value Date/Time     09/17/2020 1052    K 4.5 09/17/2020 1052     09/17/2020 1052    CO2 29 09/17/2020 1052    BUN 16 09/17/2020 1052    CREATININE 0.7 09/17/2020 1052        Component Value Date/Time    CALCIUM 9.4 09/17/2020 1052    ALKPHOS 88 09/17/2020 1052    AST 17 09/17/2020 1052    ALT 14 09/17/2020 1052    BILITOT 0.5 09/17/2020 1052          Lab Results   Component Value Date    SEDRATE 5 05/22/2019     Lab Results   Component Value Date    CRP 1.6 05/22/2019     Lab Results   Component Value Date    ARIELA Negative 05/22/2019     Lab Results   Component Value Date    RF 14.0 05/22/2019    CCPABIGG 2 05/22/2019     Lab Results   Component Value Date    ARIELA Negative 05/22/2019     No results found for: DSDNAG, DSDNAIGGIFA  No results found for: Cheryle Clamp  No results found for: SMAB, RNPAB  No results found for: CENTABIGG  No results found for: C3, C4, ACE  No results found for: Maryelizabecitlalli Silvius, [id number]  No results found for: Isadora Saunders  No results found for: Beny Renae  Lab Results   Component Value Date    TSH 3.98 07/06/2011     No results found for: VITD25    Radiology:    Left hand X-ray images were reviewed independently-degenerative arthritis involving DIP and PIP joints with SEA GULL appearance consistent with erosive osteoarthritis.   No evidence of rheumatoid arthritis    ASSESSMENT AND PLAN      Assessment/Plan:      ASSESSMENT:    1. Erosive osteoarthritis of both hands        PLAN:   1. Erosive osteoarthritis of both hands  Bilateral hand x-rays with erosive osteoarthritis. RF 14, CCP negative, normal ESR and CRP  Flareup in hands for past 2 weeks  We will start prednisone taper  Continue naproxen 500 mg twice a day and diclofenac gel as needed  Continue hand exercises-  Advised to call back and make a sooner appointment if she continues to have frequent flareups  - Comprehensive Metabolic Panel; Future    The patient indicates understanding of these issues and agrees with the plan. Return in about 6 months (around 9/18/2021). The risks and benefits of my recommendations, as well as other treatment options, benefits and side effects werediscussed with the patient. All questions were answered. ######################################################################    I thank you for giving me theopportunity to participate in [facility]. If you have any questions or concerns please feel free to contact me. I look forward to following  Ciro Sarah along with you. Electronically signed by: Brandt Chappell MD, 3/18/2021 10:14 AM    Documentation was done using voice recognition dragon software. Every effort was made to ensure accuracy;however, inadvertent unintentional computerized transcription errors may be present.

## 2021-03-25 LAB
A/G RATIO: 1.7 (ref 1–2.1)
ALBUMIN SERPL-MCNC: 4 G/DL (ref 3.5–5)
ALP BLD-CCNC: 76 U/L (ref 33–140)
ALT SERPL-CCNC: 17 U/L (ref 0–40)
ANION GAP SERPL CALCULATED.3IONS-SCNC: 8 MMOL/L (ref 5–13)
AST SERPL-CCNC: 24 U/L (ref 0–30)
BILIRUB SERPL-MCNC: 0.7 MG/DL (ref 0.2–1.2)
BUN / CREAT RATIO: 25
BUN BLDV-MCNC: 20 MG/DL (ref 7–25)
C-REACTIVE PROTEIN WIDE RANGE: 3.1 MG/L (ref 0–5)
CALCIUM SERPL-MCNC: 9.1 MG/DL (ref 8.4–10.5)
CHLORIDE BLD-SCNC: 107 MMOL/L (ref 98–110)
CO2: 31 MMOL/L (ref 22–29)
CREAT SERPL-MCNC: 0.81 MG/DL (ref 0.5–1.2)
ERYTHROCYTE SEDIMENTATION RATE: 4 MM/HR (ref 0–30)
GFR AFRICAN AMERICAN: 85 SEE NOTE
GFR NON-AFRICAN AMERICAN: 70 SEE NOTE
GLOBULIN: 2.3 G/DL (ref 2–3.7)
GLUCOSE BLD-MCNC: 107 MG/DL (ref 71–99)
POTASSIUM SERPL-SCNC: 4.7 MMOL/L (ref 3.5–5.1)
SODIUM BLD-SCNC: 146 MMOL/L (ref 135–146)
TOTAL PROTEIN: 6.3 G/DL (ref 6–8)

## 2021-06-01 ENCOUNTER — HOSPITAL ENCOUNTER (EMERGENCY)
Age: 71
Discharge: HOME | End: 2021-06-01
Attending: EMERGENCY MEDICINE
Payer: MEDICARE

## 2021-06-01 VITALS
SYSTOLIC BLOOD PRESSURE: 161 MMHG | HEART RATE: 83 BPM | DIASTOLIC BLOOD PRESSURE: 71 MMHG | TEMPERATURE: 98 F | RESPIRATION RATE: 14 BRPM | OXYGEN SATURATION: 100 %

## 2021-06-01 DIAGNOSIS — S01.312A LACERATION OF AURICLE OF LEFT EAR, INITIAL ENCOUNTER: Primary | ICD-10-CM

## 2021-06-01 PROCEDURE — 99283 EMERGENCY DEPT VISIT LOW MDM: CPT

## 2021-06-01 PROCEDURE — 12011 RPR F/E/E/N/L/M 2.5 CM/<: CPT

## 2021-06-01 RX ORDER — CEPHALEXIN 500 MG/1
500 CAPSULE ORAL 4 TIMES DAILY
Qty: 20 CAPSULE | Refills: 0 | Status: SHIPPED | OUTPATIENT
Start: 2021-06-01 | End: 2021-06-06

## 2021-06-01 ASSESSMENT — PAIN DESCRIPTION - PAIN TYPE
TYPE: ACUTE PAIN
TYPE: ACUTE PAIN

## 2021-06-01 ASSESSMENT — PAIN DESCRIPTION - LOCATION
LOCATION: EAR;NOSE
LOCATION: HEAD

## 2021-06-01 ASSESSMENT — PAIN DESCRIPTION - ONSET: ONSET: GRADUAL

## 2021-06-01 ASSESSMENT — PAIN DESCRIPTION - DESCRIPTORS: DESCRIPTORS: ACHING;SORE

## 2021-06-01 ASSESSMENT — PAIN DESCRIPTION - FREQUENCY: FREQUENCY: CONTINUOUS

## 2021-06-01 ASSESSMENT — PAIN - FUNCTIONAL ASSESSMENT: PAIN_FUNCTIONAL_ASSESSMENT: 0-10

## 2021-06-01 ASSESSMENT — PAIN SCALES - GENERAL
PAINLEVEL_OUTOF10: 2
PAINLEVEL_OUTOF10: 2

## 2021-06-01 ASSESSMENT — PAIN DESCRIPTION - PROGRESSION: CLINICAL_PROGRESSION: NOT CHANGED

## 2021-06-01 NOTE — ED PROVIDER NOTES
[facility] ENCOUNTER      Pt Name: Bob Billingsley  MRN: [id number]  [place] 1950  Date of evaluation: 6/1/2021  Provider: Haylee Blanco, [address] [place] [address]       Chief Complaint   Patient presents with    Laceration     LEFT ear laceration          HISTORY OF PRESENT ILLNESS   (Location/Symptom, Timing/Onset, Context/Setting, Quality, Duration, Modifying Factors, Severity)  Note limiting factors. Bob Billingsley is a 79 y.o. female who presents to the emergency department with a complaint of a laceration of the left ear that she sustained just prior to arrival.  She states that she was bending over, stumbled, and fell forward in the garage. She states that she struck her nose on the wall and struck her left ear on the edge of a large hard plastic recycle bin. There was no loss of consciousness. No preceding dizziness or lightheadedness. This was a mechanical fall. She denies any headache, vision change, focal weakness or numbness. She denies any neck pain back pain chest pain shortness of breath. No nausea or vomiting. No seizure. She takes aspirin occasionally but not on a daily basis. Her last dose of aspirin was last week. She does not take any other anticoagulants. She is diabetic. She has an insulin pump. Her blood sugars currently 135. He denies any associated hypoglycemia or difficulties with her diabetes recently. She denies any focal weakness or numbness, vision change, speech change, or facial droop. Nursing Notes were reviewed. HPI        REVIEW OF SYSTEMS    (2-9 systems for level 4, 10 or more for level 5)       Constitutional: Negative for fever or chills. HENT: Negative for rhinorrhea and sore throat. Eyes: Negative for redness or drainage. Respiratory: Negative for shortness of breath or dyspnea on exertion. Cardiovascular: Negative for chest pain.    Gastrointestinal: Negative for abdominal pain.  Negative for vomiting or diarrhea. Genitourinary: Negative for flank pain. Negative for dysuria. Negative for hematuria. Neurological: Negative for headache. All systems are reviewed and are negative except for those listed above in the history of present illness and ROS. PAST MEDICAL HISTORY     Past Medical History:   Diagnosis Date    Anxiety     DM (diabetes mellitus), type 1 ([facility] [address]     Hyperlipidemia     Hypertension     Osteoarthritis     Urinary incontinence          SURGICAL HISTORY       Past Surgical History:   Procedure Laterality Date    APPENDECTOMY  1968   [address]    2    COLONOSCOPY  4/23/2015    normal-ghastine    GALLBLADDER SURGERY  1996    KNEE ARTHROSCOPY Left 2001    [place] REPAIR MAJOR PERIPHERAL NERVE Left 8/22/2019    LEFT ULNAR NERVE DECOMPRESSION (AT ELBOW) performed by Nick Roldan MD at [address] Left 08/22/2019    LEFT ULNAR NERVE DECOMPRESSION (AT ELBOW) (Left )         CURRENT MEDICATIONS       Previous Medications    ASPIRIN 81 MG TABLET    Take 81 mg by mouth as needed 3 times a week    ATORVASTATIN (LIPITOR) 20 MG TABLET    Take 20 mg by mouth every evening     DICLOFENAC SODIUM (VOLTAREN) 1 % GEL    Apply 4 g topically 4 times daily    EVISTA 60 MG TABLET    TAKE 1 TABLET AT BEDTIME    INSULIN ASPART (NOVOLOG) 100 UNIT/ML INJECTION VIAL    Inject 125 Units into the skin daily    INSULIN LISPRO, HUMAN, (INSULIN PUMP) SHAHZAD    Inject  into the skin continuous. MYCOPHENOLATE (CELLCEPT) 500 MG TABLET    Take 500 mg by mouth 2 times daily    NAPROXEN (NAPROSYN) 500 MG TABLET    Take 1 tablet by mouth 2 times daily (with meals)    PREDNISONE (DELTASONE) 5 MG TABLET    Take 4 tabs daily for 3 days, 3 tabs daily for 3 days, 2 tabs daily for 3 days, 1 tab daily for 3 days and stop    THERAPEUTIC MULTIVITAMIN-MINERALS (THERAGRAN-M) TABLET    Take 1 tablet by mouth daily.          ALLERGIES Amoxicillin-pot clavulanate, Imuran [azathioprine], Sulfa antibiotics, and Codeine    FAMILY HISTORY       Family History   Problem Relation Age of Onset    Cancer Mother         Tigress leukemia    Colon Cancer Father     Heart Disease Maternal Grandmother     Heart Disease Maternal Grandfather     Heart Attack Maternal Grandfather     Arthritis Paternal Grandmother           SOCIAL HISTORY       Social History     Socioeconomic History    Marital status:      Spouse name: None    Number of children: 2    Years of education: None    Highest education level: None   Occupational History    Occupation: Retired     Comment: elementary school   Tobacco Use    Smoking status: Never Smoker    Smokeless tobacco: Never Used   Substance and Sexual Activity    Alcohol use: No    Drug use: No    Sexual activity: Yes     Partners: Female, Male   Other Topics Concern    None   Social History Narrative    None     Social Determinants of Health     Financial Resource Strain:     Difficulty of Paying Living Expenses:    Food Insecurity:     Worried About Running Out of Food in the Last Year:     Ran Out of Food in the Last Year:    Transportation Needs:     Lack of Transportation (Medical):      Lack of Transportation (Non-Medical):    Physical Activity:     Days of Exercise per Week:     Minutes of Exercise per Session:    Stress:     Feeling of Stress :    Social Connections:     Frequency of Communication with Friends and Family:     Frequency of Social Gatherings with Friends and Family:     Attends Jainism Services:     Active Member of Clubs or Organizations:     Attends Club or Organization Meetings:     Marital Status:    Intimate Partner Violence:     Fear of Current or Ex-Partner:     Emotionally Abused:     Physically Abused:     Sexually Abused:        SCREENINGS             PHYSICAL EXAM    (up to 7 for level 4, 8 or more for level 5)     ED Triage Vitals [06/01/21 1209] BP Temp Temp Source Pulse Resp SpO2 Height Weight   (!) 161/71 98 °F (36.7 °C) Oral 83 14 100 % -- --         Physical Exam   Constitutional: Awake and alert. Very pleasant. Appears comfortable. Head: No visible evidence of trauma. Normocephalic. Eyes: Pupils equal and reactive. No photophobia. Conjunctiva normal.    HENT: Oral mucosa moist.  Airway patent. Pharynx without erythema. Nares were clear. Slight erythema noted the bridge of the nose. No septal hematoma. Nares were clear. No evidence of blood. No bony tenderness to the nose or to the face. TMJ was nontender bilaterally. Mandible nontender reformed motion. No skull tenderness or step-off. No kapadia sign. No hemotympanum. There was a vertically oriented linear laceration to the posterior aspect of the left pinna measuring 2.5 cm. Laceration was at the junction of the ear and the scalp. Wound penetrated to the very superficial subcutaneous tissue. No active bleeding. No foreign body. Auditory meatus was normal.  Anterior ear was normal.  No visible cartilage. Neck:  Soft and supple. Nontender. No point or axial tenderness. Heart:  Regular rate and rhythm. Lungs: No conversational dyspnea or accessory muscle use. Chest: Chest wall non-tender. No evidence of trauma. Abdomen:  Soft, nondistended, bowel sounds present. Nontender. No guarding rigidity or rebound. No masses. Musculoskeletal: Extremities non-tender with full range of motion. Radial and dorsalis pedis pulses were intact. No calf tenderness erythema or edema. Neurological: Alert and oriented x 3. Speech clear. Cranial nerves II-XII intact. No facial droop. No acute focal motor or sensory deficits. Skin: Skin is warm and dry. No rash. Psychiatric: Normal mood and affect.  Behavior is normal.         DIAGNOSTIC RESULTS     EKG: All EKG's are interpreted by the Emergency Department Physician who either signs or Co-signs this chart in the absence of a cardiologist.        RADIOLOGY:   Non-plain film images such as CT, Ultrasound and MRI are read by the radiologist. Plain radiographic images are visualized and preliminarily interpreted by the emergency physician with the below findings:        Interpretation per the Radiologist below, if available at the time of this note:    No orders to display         ED BEDSIDE ULTRASOUND:   Performed by ED Physician - none    LABS:  Labs Reviewed - No data to display    All other labs were within normal range or not returned as of this dictation. EMERGENCY DEPARTMENT COURSE and DIFFERENTIAL DIAGNOSIS/MDM:   Vitals:    Vitals:    06/01/21 1209   BP: (!) 161/71   Pulse: 83   Resp: 14   Temp: 98 °F (36.7 °C)   TempSrc: Oral   SpO2: 100%         MDM      The patient presents with a laceration to the left ear. Tetanus immunization is up-to-date. Wound care was provided. Laceration was repaired. She will be placed on Keflex 500 mg 4 times daily for 5 days. No evidence of closed head injury. She did hit her head but is neurologically intact. GCS is 15. She will be given routine head injury instructions. REASSESSMENT          Follow-up with your primary care physician for suture or staple removal in 7 to 10 days. Keep wound clean and dry for at least 24 hours. After 24 hours you may shower and clean the area with warm soapy water. Avoid submerging the wound underwater in a bathtub, swimming pool, or hot tub until sutures are removed. If condition worsens or new symptoms develop, return immediately to the emergency department. CRITICAL CARE TIME   Total Critical Care time was 0 minutes, excluding separately reportable procedures. There was a high probability of clinically significant/life threatening deterioration in the patient's condition which required my urgent intervention.       CONSULTS:  None    PROCEDURES:  Unless otherwise noted below, none     Procedures    Left ear laceration repair:    There was a 2.5 cm linear longitudinally oriented laceration on the posterior aspect of the left pinna at the junction of the ear and the scalp. The wound penetrated to the superficial subcutaneous tissue only. Wound was explored. No active bleeding. No foreign body. No visible cartilage. Local anesthesia was given with 1% plain lidocaine subcutaneously, 3 mL. Wound was approximated with a total of 5, 5-0 nylon simple sutures. FINAL IMPRESSION      1. Laceration of auricle of left ear, initial encounter          DISPOSITION/PLAN   DISPOSITION Decision To Discharge 06/01/2021 12:47:39 PM      PATIENT REF[facility]:  [facility] [place] [facility]  [phone]    Call today        DISCHARGE MEDICATIONS:  New Prescriptions    CEPHALEXIN (KEFLEX) 500 MG CAPSULE    Take 1 capsule by mouth 4 times daily for 5 days     Controlled Substances Monitoring:     No flowsheet data found. (Please note that portions of this note were completed with a voice recognition program.  Efforts were made to edit the dictations but occasionally words are mis-transcribed. )    Tarun Sommer DO (electronically signed)  Attending Emergency Physician          David Magaña DO  06/01/21 0521

## 2021-06-01 NOTE — ED NOTES
Pt was reaching into her recycling bin and it moved. She said she hit her head on the wall small abrasion to her nose .  Her ear hit the bin and there is a small laceration to the back of her left ear      Nadira Cohen RN  06/01/21 0783

## 2021-09-16 ENCOUNTER — OFFICE VISIT (OUTPATIENT)
Dept: RHEUMATOLOGY | Age: 71
End: 2021-09-16
Payer: MEDICARE

## 2021-09-16 VITALS
DIASTOLIC BLOOD PRESSURE: 75 MMHG | HEART RATE: 101 BPM | SYSTOLIC BLOOD PRESSURE: 146 MMHG | BODY MASS INDEX: 25.89 KG/M2 | WEIGHT: 158 LBS

## 2021-09-16 DIAGNOSIS — M15.4 EROSIVE OSTEOARTHRITIS OF BOTH HANDS: Primary | ICD-10-CM

## 2021-09-16 LAB
A/G RATIO: 2 (ref 1.1–2.2)
ALBUMIN SERPL-MCNC: 4.3 G/DL (ref 3.4–5)
ALP BLD-CCNC: 73 U/L (ref 40–129)
ALT SERPL-CCNC: 12 U/L (ref 10–40)
ANION GAP SERPL CALCULATED.3IONS-SCNC: 15 MMOL/L (ref 3–16)
AST SERPL-CCNC: 19 U/L (ref 15–37)
BILIRUB SERPL-MCNC: 0.4 MG/DL (ref 0–1)
BUN BLDV-MCNC: 13 MG/DL (ref 7–20)
CALCIUM SERPL-MCNC: 9.6 MG/DL (ref 8.3–10.6)
CHLORIDE BLD-SCNC: 105 MMOL/L (ref 99–110)
CO2: 26 MMOL/L (ref 21–32)
CREAT SERPL-MCNC: 0.7 MG/DL (ref 0.6–1.2)
GFR AFRICAN AMERICAN: >60
GFR NON-AFRICAN AMERICAN: >60
GLOBULIN: 2.2 G/DL
GLUCOSE BLD-MCNC: 80 MG/DL (ref 70–99)
POTASSIUM SERPL-SCNC: 4.5 MMOL/L (ref 3.5–5.1)
SODIUM BLD-SCNC: 146 MMOL/L (ref 136–145)
TOTAL PROTEIN: 6.5 G/DL (ref 6.4–8.2)

## 2021-09-16 PROCEDURE — 99213 OFFICE O/P EST LOW 20 MIN: CPT | Performed by: INTERNAL MEDICINE

## 2021-09-16 RX ORDER — BLOOD-GLUCOSE TRANSMITTER
EACH MISCELLANEOUS
COMMUNITY
Start: 2021-09-09

## 2021-09-16 NOTE — PROGRESS NOTES
2021   Patient Name: Mich Hobbs  : 1950  Medical Record: [id number]    MEDICATIONS  Current Outpatient Medications   Medication Sig Dispense Refill    Continuous Blood Gluc Transmit (DEXCOM G6 TRANSMITTER) MISC 1 EACH SEE INSTRUCTIONS. CHANGE DEVICE EVERY 3 MONTHS.  Continuous Blood Gluc Sensor (DEXCOM G6 SENSOR) MISC 1 each Every 10 days      fluocinonide (LIDEX) 0.05 % external solution       insulin aspart (NOVOLOG) 100 UNIT/ML injection vial Inject 125 Units into the skin daily      diclofenac sodium (VOLTAREN) 1 % GEL Apply 4 g topically 4 times daily 1 Tube 1    naproxen (NAPROSYN) 500 MG tablet Take 1 tablet by mouth 2 times daily (with meals) 60 tablet 5    EVISTA 60 MG tablet TAKE 1 TABLET AT BEDTIME 90 tablet 1    therapeutic multivitamin-minerals (THERAGRAN-M) tablet Take 1 tablet by mouth daily.  aspirin 81 MG tablet Take 81 mg by mouth as needed 3 times a week      atorvastatin (LIPITOR) 20 MG tablet Take 20 mg by mouth every evening        No current facility-administered medications for this visit. ALLERGIES  Allergies   Allergen Reactions    Imuran [Azathioprine] Other (See Comments)     Causes liver enzymes to increase     Sulfa Antibiotics Hives    Codeine Nausea And Vomiting         Comments  No specialty comments available. Background history:  Mich Hobbs is a 70 y.o. female who is being seen for follow up evaluation of  bilateral hand pain. Symptoms started 3 days ago and is worse in the left hand mainly in the left third PIP joint. She has been on daily basis, 4 out of 10, aching, worse with using her hands without any significant relieving factors. She has noticed swelling in left third PIP joint. She has a morning stiffness lasting for few minutes. She has some difficulty opening jars. She denies any other joint pain or swelling or stiffness. She has tried over-the-counter Aleve and Advil with relief.   She denies fever, weight loss, night sweats or swollen glands. Her grandmother had rheumatoid arthritis. She denies psoriasis, inflammatory bowel disease, inflammatory back pain, dactylitis, enthesitis, tenosynovitis and uveitis. She had left hand x-ray which showed changes consistent with degenerative arthritis mainly in her DIP and PIP joints. Interim history:. She presents for a follow-up of erosive osteoarthritis. She takes naproxen 500 mg as needed and uses diclofenac gel as needed. She gets occasional achiness in the hands associated with stiffness which resolves within half an hour or so. She takes naproxen 500 mg as needed and uses diclofenac gel as needed. She denies any side effects with the medications. Blood work shows positive RF 14, CCP negative, normal ESR and CRP. Hand x-rays with erosive osteoarthritis. HPI  Review of Systems    REVIEW OF SYSTEMS: Positive for fatigue  Constitutional: No unanticipated weight loss or fevers. Integumentary: No rash, photosensitivity, malar rash, livedo reticularis, alopecia and Raynaud's symptoms, sclerodactyly, skin tightening  Eyes: negative for visual disturbance and persistent redness, discharge from eyes   ENT: - No tinnitus, loss of hearing, vertigo, or recurrent ear infections.  - No history of nasal/oral ulcers. - No history of dry eyes/dry mouth  Cardiovascular: No history of pericarditis, chest pain or murmur or palpitations  Respiratory: No shortness of breath, cough or history of interstitial lung disease. No history of pleurisy. No history of tuberculosis or atypical infections. Gastrointestinal: No history of heart burn, dysphagia or esophageal dysmotility. No change in bowel habits or any inflammatory bowel disease. Genitourinary: No history renal disease, miscarriages. Hematologic/Lymphatic: No abnormal bruising or bleeding, blood clots or swollen lymph nodes. Neurological: No history of headaches, seizure or focal weakness.  No history of neuropathies, paresthesias or hyperesthesias, facial droop, diplopia  Psychiatric: No history of bipolar disease, depression  Endocrine: Denies any polyuria, polydipsia and osteoporosis  Allergic/Immunologic: No nasal congestion or hives. I have reviewed patients Past medical History, Social History and Family History as mentioned in her chart and this remains unchanged fromprevious.     Past Medical History:   Diagnosis Date    Anxiety     DM (diabetes mellitus), type 1 ([address]     Hyperlipidemia     Hypertension     Osteoarthritis     Urinary incontinence      Past Surgical History:   Procedure Laterality Date    APPENDECTOMY       SECTION  ,     2    COLONOSCOPY  2015    normal-ghastine    GALLBLADDER SURGERY      KNEE ARTHROSCOPY Left     [place] REPAIR MAJOR PERIPHERAL NERVE Left 2019    LEFT ULNAR NERVE DECOMPRESSION (AT ELBOW) performed by Amira Freire MD at [address] Left 2019    LEFT ULNAR NERVE DECOMPRESSION (AT ELBOW) (Left )     Social History     Socioeconomic History    Marital status:      Spouse name: Not on file    Number of children: 2    Years of education: Not on file    Highest education level: Not on file   Occupational History    Occupation: Retired     Comment: elementary school   Tobacco Use    Smoking status: Never Smoker    Smokeless tobacco: Never Used   Vaping Use    Vaping Use: Never used   Substance and Sexual Activity    Alcohol use: No    Drug use: No    Sexual activity: Yes     Partners: Female, Male   Other Topics Concern    Not on file   Social History Narrative    Not on file     Social Determinants of Health     Financial Resource Strain: Low Risk     Difficulty of Paying Living Expenses: Not hard at all   Food Insecurity: No Food Insecurity    Worried About [address] in the Last Year: Never true    [place] of Food in the Last Year: Never true   Transportation Needs:  Lack of Transportation (Medical):      Lack of Transportation (Non-Medical):    Physical Activity:     Days of Exercise per Week:     Minutes of Exercise per Session:    Stress:     Feeling of Stress :    Social Connections:     Frequency of Communication with Friends and Family:     Frequency of Social Gatherings with Friends and Family:     Attends Advent Services:     Active Member of Clubs or Organizations:     Attends Club or Organization Meetings:     Marital Status:    Intimate Partner Violence:     Fear of Current or Ex-Partner:     Emotionally Abused:     Physically Abused:     Sexually Abused:      Family History   Problem Relation Age of Onset    Cancer Mother         Tigress leukemia    Colon Cancer Father     Heart Disease Maternal Grandmother     Heart Disease Maternal Grandfather     Heart Attack Maternal Grandfather     Arthritis Paternal Grandmother          PHYSICAL EXAMINATION:  Vitals:    09/16/21 0845   BP: (!) 146/75   Pulse: 101   Weight: 158 lb (71.7 kg)       Physical Exam  Constitutional:  Well developed, well nourished, no acute distress, non-toxic appearance   Musculoskeletal:    RIGHT  Swell  Tender  ROM  LEFT  Swell  Tender  ROM    DIP2  0  0  [place]   0  0  Heberden    DIP3  0  0  Heberden   0  0  Heberden    DIP4  0  0  Heberden   0  0  Heberden    DIP5  0  0  Heberden   0  0  Heberden    PIP1  0  0  Bony change   0  0  Bony change    PIP2  0 0 Bony change   0 0 Bony change    PIP3  0 0 Bony change   0 0 Bony change    PIP4  0  0  FULL   0  0  FULL    PIP5  0  0  FULL   0  0  FULL    MCP1  0  0  FULL   0  0  FULL    MCP2  0  0  FULL   0  0  FULL    MCP3  0  0  FULL   0  0  FULL    MCP4  0  0  FULL   0  0  FULL    MCP5  0  0  FULL   0  0  FULL    Wrist  0  0  FULL   0  0  FULL    Elbow  0  0  FULL   0  0  FULL    Shouldr  0  0  FULL   0  0  FULL    Hip  0  0  FULL   0  0  FULL    Knee  0  0  FULL   0  0  FULL    Ankle  0  0  FULL   0  0  FULL    MTP1  0  0 FULL   0  0  FULL    MTP2  0  0  FULL   0  0  FULL    MTP3  0  0  FULL   0  0  FULL    MTP4  0  0  FULL   0  0  FULL    MTP5  0  0  FULL   0  0  FULL    IP1  0  0  FULL   0  0  FULL    IP2  0  0  FULL   0  0  FULL    IP3  0  0  FULL   0  0  FULL    IP4  0  0  FULL   0  0  FULL    IP5  0  0  FULL   0 0 FULL     Bony enlargement and squaring of bilateral CMC joints. Ambulates without assistance, normal gait  Neck: Full ROM, no tenderness,supple   Back- no tenderness. Eyes:  PERRL, extra ocular movements intact, conjunctiva normal   HEENT:  Atraumatic, normocephalic, external ears normal, oropharynx moist, no pharyngeal exudates. Respiratory:  No respiratory distress  GI:  Soft, nondistended, normal bowel sounds, nontender, noorganomegaly, no mass, no rebound, no guarding   :  No costovertebral angle tenderness   Integument:  Well hydrated, no rash or telangiectasias  Lymphatic:  No lymphadenopathy noted   Neurologic:   Alert & oriented x 3, CN 2-12 normal, no focal deficits noted. Sensations Intact. Muscle strength 5/5 proximallyand distally in upper and lower extremities.    Psychiatric:  Speech and behavior appropriate         LABS AND IMAGING  Outside data reviewed and in HPI    Lab Results   Component Value Date    WBC 7.9 03/18/2020    RBC 4.80 03/18/2020    HGB 14.3 03/18/2020    HCT 44.9 03/18/2020     03/18/2020    MCV 93.7 03/18/2020    MCH 29.8 03/18/2020    MCHC 31.8 03/18/2020    RDW 13.5 03/18/2020    SEGSPCT 50.9 07/06/2011    LYMPHOPCT 25.0 03/18/2020    MONOPCT 9.7 03/18/2020    EOSPCT 1.7 07/06/2011    BASOPCT 0.9 03/18/2020    MONOSABS 0.8 03/18/2020    LYMPHSABS 2.0 03/18/2020    EOSABS 0.1 03/18/2020    BASOSABS 0.1 03/18/2020       Chemistry        Component Value Date/Time     03/25/2021 1522    K 4.7 03/25/2021 1522     03/25/2021 1522    CO2 31 (H) 03/25/2021 1522    BUN 20 03/25/2021 1522    CREATININE 0.81 03/25/2021 1522        Component Value Date/Time    CALCIUM 9.1 03/25/2021 1522    ALKPHOS 76 03/25/2021 1522    AST 24 03/25/2021 1522    ALT 17 03/25/2021 1522    BILITOT 0.7 03/25/2021 1522          Lab Results   Component Value Date    SEDRATE 5 05/22/2019     Lab Results   Component Value Date    CRP 1.6 05/22/2019     Lab Results   Component Value Date    ARIELA Negative 05/22/2019     Lab Results   Component Value Date    RF 14.0 05/22/2019    CCPABIGG 2 05/22/2019     Lab Results   Component Value Date    ARIELA Negative 05/22/2019     No results found for: DSDNAG, DSDNAIGGIFA  No results found for: Collette Ginger  No results found for: SMAB, RNPAB  No results found for: CENTABIGG  No results found for: C3, C4, ACE  No results found for: Elizabeth Petties, [id number]  No results found for: Debbie Roulette  No results found for: Waysharia Halim  Lab Results   Component Value Date    TSH 3.98 07/06/2011     No results found for: VITD25    Radiology:    Left hand X-ray images were reviewed independently-degenerative arthritis involving DIP and PIP joints with SEA GULL appearance consistent with erosive osteoarthritis. No evidence of rheumatoid arthritis    ASSESSMENT AND PLAN      Assessment/Plan:      ASSESSMENT:    1. Erosive osteoarthritis of both hands        PLAN:   1. Erosive osteoarthritis of both hands  Bilateral hand x-rays with erosive osteoarthritis. RF 14, CCP negative, normal ESR and CRP  No active synovitis on joint exam  Continue naproxen 500 mg twice a day as needed and diclofenac gel as needed  Continue hand exercises-  - Comprehensive Metabolic Panel; Future    The patient indicates understanding of these issues and agrees with the plan. Return in about 6 months (around 3/16/2022). The risks and benefits of my recommendations, as well as other treatment options, benefits and side effects werediscussed with the patient. All questions were answered.        ######################################################################    I thank you for giving me theopportunity to participate in [facility]. If you have any questions or concerns please feel free to contact me. I look forward to following  [place] along with you. Electronically signed by: Wendy Reed MD, MD, 9/16/2021 9:17 AM    Documentation was done using voice recognition dragon software. Every effort was made to ensure accuracy;however, inadvertent unintentional computerized transcription errors may be present.

## 2022-03-17 ENCOUNTER — HOSPITAL ENCOUNTER (OUTPATIENT)
Dept: WOMENS IMAGING | Age: 72
Discharge: HOME | End: 2022-03-17
Payer: MEDICARE

## 2022-03-17 ENCOUNTER — OFFICE VISIT (OUTPATIENT)
Dept: RHEUMATOLOGY | Age: 72
End: 2022-03-17
Payer: MEDICARE

## 2022-03-17 VITALS
BODY MASS INDEX: 26.06 KG/M2 | DIASTOLIC BLOOD PRESSURE: 75 MMHG | WEIGHT: 159 LBS | SYSTOLIC BLOOD PRESSURE: 143 MMHG | HEART RATE: 108 BPM

## 2022-03-17 DIAGNOSIS — Z12.31 BREAST CANCER SCREENING BY MAMMOGRAM: ICD-10-CM

## 2022-03-17 DIAGNOSIS — M15.4 EROSIVE OSTEOARTHRITIS OF BOTH HANDS: ICD-10-CM

## 2022-03-17 LAB
A/G RATIO: 2 (ref 1.1–2.2)
ALBUMIN SERPL-MCNC: 4.1 G/DL (ref 3.4–5)
ALP BLD-CCNC: 75 U/L (ref 40–129)
ALT SERPL-CCNC: 11 U/L (ref 10–40)
ANION GAP SERPL CALCULATED.3IONS-SCNC: 10 MMOL/L (ref 3–16)
AST SERPL-CCNC: 19 U/L (ref 15–37)
BILIRUB SERPL-MCNC: 0.4 MG/DL (ref 0–1)
BUN BLDV-MCNC: 14 MG/DL (ref 7–20)
CALCIUM SERPL-MCNC: 9.9 MG/DL (ref 8.3–10.6)
CHLORIDE BLD-SCNC: 105 MMOL/L (ref 99–110)
CO2: 27 MMOL/L (ref 21–32)
CREAT SERPL-MCNC: 0.8 MG/DL (ref 0.6–1.2)
GFR AFRICAN AMERICAN: >60
GFR NON-AFRICAN AMERICAN: >60
GLUCOSE BLD-MCNC: 83 MG/DL (ref 70–99)
POTASSIUM SERPL-SCNC: 5.2 MMOL/L (ref 3.5–5.1)
SODIUM BLD-SCNC: 142 MMOL/L (ref 136–145)
TOTAL PROTEIN: 6.2 G/DL (ref 6.4–8.2)

## 2022-03-17 PROCEDURE — 77063 BREAST TOMOSYNTHESIS BI: CPT

## 2022-03-17 PROCEDURE — 99213 OFFICE O/P EST LOW 20 MIN: CPT | Performed by: INTERNAL MEDICINE

## 2022-03-17 RX ORDER — NAPROXEN 500 MG/1
500 TABLET ORAL 2 TIMES DAILY WITH MEALS
Qty: 60 TABLET | Refills: 5 | Status: SHIPPED | OUTPATIENT
Start: 2022-03-17

## 2022-03-17 NOTE — PROGRESS NOTES
3/17/2022   Patient Name: Minh Pham  : 1950  Medical Record: [id number]    MEDICATIONS  Current Outpatient Medications   Medication Sig Dispense Refill    naproxen (NAPROSYN) 500 MG tablet Take 1 tablet by mouth 2 times daily (with meals) 60 tablet 5    Continuous Blood Gluc Transmit (DEXCOM G6 TRANSMITTER) MISC 1 EACH SEE INSTRUCTIONS. CHANGE DEVICE EVERY 3 MONTHS.  Continuous Blood Gluc Sensor (DEXCOM G6 SENSOR) MISC 1 each Every 10 days      fluocinonide (LIDEX) 0.05 % external solution       insulin aspart (NOVOLOG) 100 UNIT/ML injection vial Inject 125 Units into the skin daily      diclofenac sodium (VOLTAREN) 1 % GEL Apply 4 g topically 4 times daily 1 Tube 1    EVISTA 60 MG tablet TAKE 1 TABLET AT BEDTIME 90 tablet 1    therapeutic multivitamin-minerals (THERAGRAN-M) tablet Take 1 tablet by mouth daily.  aspirin 81 MG tablet Take 81 mg by mouth as needed 3 times a week      atorvastatin (LIPITOR) 20 MG tablet Take 20 mg by mouth every evening        No current facility-administered medications for this visit. ALLERGIES  Allergies   Allergen Reactions    Imuran [Azathioprine] Other (See Comments)     Causes liver enzymes to increase     Sulfa Antibiotics Hives    Codeine Nausea And Vomiting         Comments  No specialty comments available. Background history:  Minh Pham is a 70 y.o. female who is being seen for follow up evaluation of  bilateral hand pain. Symptoms started 3 days ago and is worse in the left hand mainly in the left third PIP joint. She has been on daily basis, 4 out of 10, aching, worse with using her hands without any significant relieving factors. She has noticed swelling in left third PIP joint. She has a morning stiffness lasting for few minutes. She has some difficulty opening jars. She denies any other joint pain or swelling or stiffness. She has tried over-the-counter Aleve and Advil with relief.   She denies fever, weight loss, night sweats or swollen glands. Her grandmother had rheumatoid arthritis. She denies psoriasis, inflammatory bowel disease, inflammatory back pain, dactylitis, enthesitis, tenosynovitis and uveitis. She had left hand x-ray which showed changes consistent with degenerative arthritis mainly in her DIP and PIP joints. Interim history:. She presents for a follow-up of erosive osteoarthritis. She takes naproxen 500 mg as needed and uses diclofenac gel as needed. She gets occasional achiness in the hands associated with stiffness. She takes naproxen 500 mg as needed and uses diclofenac gel as needed. She denies any side effects with the medications. Blood work shows positive RF 14, CCP negative, normal ESR and CRP. Hand x-rays with erosive osteoarthritis. HPI  Review of Systems    REVIEW OF SYSTEMS: Positive for fatigue  Constitutional: No unanticipated weight loss or fevers. Integumentary: No rash, photosensitivity, malar rash, livedo reticularis, alopecia and Raynaud's symptoms, sclerodactyly, skin tightening  Eyes: negative for visual disturbance and persistent redness, discharge from eyes   ENT: - No tinnitus, loss of hearing, vertigo, or recurrent ear infections.  - No history of nasal/oral ulcers. - No history of dry eyes/dry mouth  Cardiovascular: No history of pericarditis, chest pain or murmur or palpitations  Respiratory: No shortness of breath, cough or history of interstitial lung disease. No history of pleurisy. No history of tuberculosis or atypical infections. Gastrointestinal: No history of heart burn, dysphagia or esophageal dysmotility. No change in bowel habits or any inflammatory bowel disease. Genitourinary: No history renal disease, miscarriages. Hematologic/Lymphatic: No abnormal bruising or bleeding, blood clots or swollen lymph nodes. Neurological: No history of headaches, seizure or focal weakness.  No history of neuropathies, paresthesias or hyperesthesias, facial droop, diplopia  Psychiatric: No history of bipolar disease, depression  Endocrine: Denies any polyuria, polydipsia and osteoporosis  Allergic/Immunologic: No nasal congestion or hives. I have reviewed patients Past medical History, Social History and Family History as mentioned in her chart and this remains unchanged fromprevious. Past Medical History:   Diagnosis Date    Anxiety     DM (diabetes mellitus), type 1 ([address]     Hyperlipidemia     Hypertension     Osteoarthritis     Urinary incontinence      Past Surgical History:   Procedure Laterality Date    APPENDECTOMY       SECTION  ,     2    COLONOSCOPY  2015    normal-ghastine    GALLBLADDER SURGERY  1996    KNEE ARTHROSCOPY Left     NV REPAIR MAJOR PERIPHERAL NERVE Left 2019    LEFT ULNAR NERVE DECOMPRESSION (AT ELBOW) performed by Elmer Lazo MD at [address] Left 2019    LEFT ULNAR NERVE DECOMPRESSION (AT ELBOW) (Left )     Social History     Socioeconomic History    Marital status:      Spouse name: Not on file    Number of children: 2    Years of education: Not on file    Highest education level: Not on file   Occupational History    Occupation: Retired     Comment: elementary school   Tobacco Use    Smoking status: Never Smoker    Smokeless tobacco: Never Used   Vaping Use    Vaping Use: Never used   Substance and Sexual Activity    Alcohol use: No    Drug use: No    Sexual activity: Yes     Partners: Female, Male   Other Topics Concern    Not on file   Social History Narrative    Not on file     Social Determinants of Health     Financial Resource Strain: Low Risk     Difficulty of Paying Living Expenses: Not hard at all   Food Insecurity: No Food Insecurity    Worried About [address] in the Last Year: Never true    [address] in the Last Year: Never true   Transportation Needs:     Lack of Transportation (Medical):  Not on file    Lack of Transportation (Non-Medical):  Not on file   Physical Activity:     Days of Exercise per Week: Not on file    Minutes of Exercise per Session: Not on file   Stress:     Feeling of Stress : Not on file   Social Connections:     Frequency of Communication with Friends and Family: Not on file    Frequency of Social Gatherings with Friends and Family: Not on file    Attends Mandaeism Services: Not on file    Active Member of Clubs or Organizations: Not on file    Attends Club or Organization Meetings: Not on file    Marital Status: Not on file   Intimate Partner Violence:     Fear of Current or Ex-Partner: Not on file    Emotionally Abused: Not on file    Physically Abused: Not on file    Sexually Abused: Not on file   Housing Stability:     Unable to Pay for Housing in the Last Year: Not on file    Number of Places Lived in the Last Year: Not on file    Unstable Housing in the Last Year: Not on file     Family History   Problem Relation Age of Onset    Cancer Mother         Tigress leukemia    Colon Cancer Father     Heart Disease Maternal Grandmother     Heart Disease Maternal Grandfather     Heart Attack Maternal Grandfather     Arthritis Paternal Grandmother          PHYSICAL EXAMINATION:  Vitals:    03/17/22 0843   BP: (!) 143/75   Pulse: 108   Weight: 159 lb (72.1 kg)       Physical Exam  Constitutional:  Well developed, well nourished, no acute distress, non-toxic appearance   Musculoskeletal:    RIGHT  Swell  Tender  ROM  LEFT  Swell  Tender  ROM    DIP2  0  0  [place]   0  0  [place]    DIP3  0  0  [place]   0  0  [place]    DIP4  0  0  [place]   0  0  [place]    DIP5  0  0  [place]   0  0  [place]    PIP1  0  0  Bony change   0  0  Bony change    PIP2  0 0 Bony change   0 0 Bony change    PIP3  0 0 Bony change   0 0 Bony change    PIP4  0  0  FULL   0  0  FULL    PIP5  0  0  FULL   0  0  FULL    MCP1  0  0  FULL   0  0  FULL    MCP2  0  0  FULL   0  0  FULL    MCP3 0  0  FULL   0  0  FULL    MCP4  0  0  FULL   0  0  FULL    MCP5  0  0  FULL   0  0  FULL    Wrist  0  0  FULL   0  0  FULL    Elbow  0  0  FULL   0  0  FULL    Shouldr  0  0  FULL   0  0  FULL    Hip  0  0  FULL   0  0  FULL    Knee  0  0   crepitus  0  0   crepitus   Ankle  0  0  FULL   0  0  FULL    MTP1  0  0  FULL   0  0  FULL    MTP2  0  0  FULL   0  0  FULL    MTP3  0  0  FULL   0  0  FULL    MTP4  0  0  FULL   0  0  FULL    MTP5  0  0  FULL   0  0  FULL    IP1  0  0  FULL   0  0  FULL    IP2  0  0  FULL   0  0  FULL    IP3  0  0  FULL   0  0  FULL    IP4  0  0  FULL   0  0  FULL    IP5  0  0  FULL   0 0 FULL     Bony enlargement and squaring of bilateral CMC joints. Ambulates without assistance, normal gait  Neck: Full ROM, no tenderness,supple   Back- no tenderness. Eyes:  PERRL, extra ocular movements intact, conjunctiva normal   HEENT:  Atraumatic, normocephalic, external ears normal, oropharynx moist, no pharyngeal exudates. Respiratory:  No respiratory distress  GI:  Soft, nondistended, normal bowel sounds, nontender, noorganomegaly, no mass, no rebound, no guarding   :  No costovertebral angle tenderness   Integument:  Well hydrated, no rash or telangiectasias  Lymphatic:  No lymphadenopathy noted   Neurologic:   Alert & oriented x 3, CN 2-12 normal, no focal deficits noted. Sensations Intact. Muscle strength 5/5 proximallyand distally in upper and lower extremities.    Psychiatric:  Speech and behavior appropriate         LABS AND IMAGING  Outside data reviewed and in HPI    Lab Results   Component Value Date    WBC 7.9 03/18/2020    RBC 4.80 03/18/2020    HGB 14.3 03/18/2020    HCT 44.9 03/18/2020     03/18/2020    MCV 93.7 03/18/2020    MCH 29.8 03/18/2020    MCHC 31.8 03/18/2020    RDW 13.5 03/18/2020    SEGSPCT 50.9 07/06/2011    LYMPHOPCT 25.0 03/18/2020    MONOPCT 9.7 03/18/2020    EOSPCT 1.7 07/06/2011    BASOPCT 0.9 03/18/2020    MONOSABS 0.8 03/18/2020    LYMPHSABS 2.0 03/18/2020 EOSABS 0.1 03/18/2020    BASOSABS 0.1 03/18/2020       Chemistry        Component Value Date/Time     (H) 09/16/2021 0952    K 4.5 09/16/2021 0952     09/16/2021 0952    CO2 26 09/16/2021 0952    BUN 13 09/16/2021 0952    CREATININE 0.7 09/16/2021 0952        Component Value Date/Time    CALCIUM 9.6 09/16/2021 0952    ALKPHOS 73 09/16/2021 0952    AST 19 09/16/2021 0952    ALT 12 09/16/2021 0952    BILITOT 0.4 09/16/2021 0952          Lab Results   Component Value Date    SEDRATE 5 05/22/2019     Lab Results   Component Value Date    CRP 1.6 05/22/2019     Lab Results   Component Value Date    ARIELA Negative 05/22/2019     Lab Results   Component Value Date    RF 14.0 05/22/2019    CCPABIGG 2 05/22/2019     Lab Results   Component Value Date    ARIELA Negative 05/22/2019     No results found for: DSDNAG, DSDNAIGGIFA  No results found for: SSAROAB, SSALAAB  No results found for: SMAB, RNPAB  No results found for: CENTABIGG  No results found for: C3, C4, ACE  No results found for: Gladis Found, [id number]  No results found for: Clementeen Shine  No results found for: Shannen Pean  Lab Results   Component Value Date    TSH 3.98 07/06/2011     No results found for: VITD25    Radiology:    Left hand X-ray images were reviewed independently-degenerative arthritis involving DIP and PIP joints with SEA GULL appearance consistent with erosive osteoarthritis. No evidence of rheumatoid arthritis    ASSESSMENT AND PLAN      Assessment/Plan:      ASSESSMENT:    1. Erosive osteoarthritis of both hands        PLAN:   1. Erosive osteoarthritis of both hands  Bilateral hand x-rays with erosive osteoarthritis. RF 14, CCP negative, normal ESR and CRP  No active synovitis on joint exam  Continue naproxen 500 mg twice a day as needed and diclofenac gel as needed  Continue hand exercises-  Paraffin wax bath  - Comprehensive Metabolic Panel;  Future    The patient indicates understanding of these issues and agrees with the plan.      Return in about 6 months (around 9/17/2022). The risks and benefits of my recommendations, as well as other treatment options, benefits and side effects werediscussed with the patient. All questions were answered. ######################################################################    I thank you for giving me theopportunity to participate in [facility]. If you have any questions or concerns please feel free to contact me. I look forward to following  Mk Dong along with you. Electronically signed by: Christine Sainz MD, MD, 3/17/2022 9:07 AM    Documentation was done using voice recognition dragon software. Every effort was made to ensure accuracy;however, inadvertent unintentional computerized transcription errors may be present.

## 2022-04-15 LAB
CATARACTS: NEGATIVE
DIABETIC RETINOPATHY: NEGATIVE
GLAUCOMA: NEGATIVE
INTRAOCULAR PRESSURE EYE: NORMAL
VISUAL ACUITY DISTANCE LEFT EYE: NORMAL
VISUAL ACUITY DISTANCE RIGHT EYE: NORMAL

## 2022-06-17 ENCOUNTER — APPOINTMENT (OUTPATIENT)
Dept: GENERAL RADIOLOGY | Age: 72
End: 2022-06-17
Payer: MEDICARE

## 2022-06-17 ENCOUNTER — HOSPITAL ENCOUNTER (EMERGENCY)
Age: 72
Discharge: HOME | End: 2022-06-17
Attending: EMERGENCY MEDICINE
Payer: MEDICARE

## 2022-06-17 VITALS
WEIGHT: 158.73 LBS | SYSTOLIC BLOOD PRESSURE: 143 MMHG | OXYGEN SATURATION: 97 % | DIASTOLIC BLOOD PRESSURE: 72 MMHG | TEMPERATURE: 98.1 F | HEART RATE: 98 BPM | HEIGHT: 66 IN | RESPIRATION RATE: 16 BRPM | BODY MASS INDEX: 25.51 KG/M2

## 2022-06-17 DIAGNOSIS — U07.1 COVID-19: Primary | ICD-10-CM

## 2022-06-17 PROCEDURE — 71045 X-RAY EXAM CHEST 1 VIEW: CPT

## 2022-06-17 PROCEDURE — 99283 EMERGENCY DEPT VISIT LOW MDM: CPT

## 2022-06-17 RX ORDER — PROMETHAZINE HYDROCHLORIDE 25 MG/1
25 TABLET ORAL EVERY 6 HOURS PRN
Qty: 20 TABLET | Refills: 0 | Status: SHIPPED | OUTPATIENT
Start: 2022-06-17 | End: 2022-06-24

## 2022-06-17 RX ORDER — BENZONATATE 100 MG/1
100 CAPSULE ORAL 3 TIMES DAILY PRN
Qty: 21 CAPSULE | Refills: 0 | Status: SHIPPED | OUTPATIENT
Start: 2022-06-17 | End: 2022-06-24

## 2022-06-17 ASSESSMENT — PAIN - FUNCTIONAL ASSESSMENT: PAIN_FUNCTIONAL_ASSESSMENT: NONE - DENIES PAIN

## 2022-06-17 NOTE — Clinical Note
Anuel Walls was seen and treated in our emergency department on 6/17/2022. She may return to work on 06/22/2022. If you have any questions or concerns, please don't hesitate to call.       Tsering Belle, DO

## 2022-06-17 NOTE — ED NOTES
Pt arrives after testing positive for COVID, pt was sent in by her doctor just to get an xray due to persistent cough. Pt denies coughing anything up. Pt denies SOB or chest pain. Pt pulse on 98% on RA and is well appearing.       Yunior Becker RN  06/17/22 0361

## 2022-07-21 PROBLEM — E10.8 TYPE 1 DIABETES MELLITUS WITH COMPLICATION (HCC): Status: RESOLVED | Noted: 2019-08-09 | Resolved: 2022-07-21

## 2022-11-03 ENCOUNTER — OFFICE VISIT (OUTPATIENT)
Dept: RHEUMATOLOGY | Age: 72
End: 2022-11-03
Payer: MEDICARE

## 2022-11-03 VITALS
SYSTOLIC BLOOD PRESSURE: 138 MMHG | TEMPERATURE: 97 F | WEIGHT: 158.4 LBS | DIASTOLIC BLOOD PRESSURE: 82 MMHG | BODY MASS INDEX: 25.96 KG/M2

## 2022-11-03 DIAGNOSIS — M15.4 EROSIVE OSTEOARTHRITIS OF BOTH HANDS: ICD-10-CM

## 2022-11-03 DIAGNOSIS — M15.4 EROSIVE OSTEOARTHRITIS OF BOTH HANDS: Primary | ICD-10-CM

## 2022-11-03 LAB
A/G RATIO: 2.1 (ref 1.1–2.2)
ALBUMIN SERPL-MCNC: 4.1 G/DL (ref 3.4–5)
ALP BLD-CCNC: 73 U/L (ref 40–129)
ALT SERPL-CCNC: 13 U/L (ref 10–40)
ANION GAP SERPL CALCULATED.3IONS-SCNC: 10 MMOL/L (ref 3–16)
AST SERPL-CCNC: 20 U/L (ref 15–37)
BILIRUB SERPL-MCNC: 0.4 MG/DL (ref 0–1)
BUN BLDV-MCNC: 13 MG/DL (ref 7–20)
CALCIUM SERPL-MCNC: 9.4 MG/DL (ref 8.3–10.6)
CHLORIDE BLD-SCNC: 103 MMOL/L (ref 99–110)
CO2: 30 MMOL/L (ref 21–32)
CREAT SERPL-MCNC: 0.8 MG/DL (ref 0.6–1.2)
GFR SERPL CREATININE-BSD FRML MDRD: >60 ML/MIN/{1.73_M2}
GLUCOSE BLD-MCNC: 194 MG/DL (ref 70–99)
POTASSIUM SERPL-SCNC: 4.9 MMOL/L (ref 3.5–5.1)
SODIUM BLD-SCNC: 143 MMOL/L (ref 136–145)
TOTAL PROTEIN: 6.1 G/DL (ref 6.4–8.2)

## 2022-11-03 PROCEDURE — 99213 OFFICE O/P EST LOW 20 MIN: CPT | Performed by: INTERNAL MEDICINE

## 2022-11-03 PROCEDURE — 1123F ACP DISCUSS/DSCN MKR DOCD: CPT | Performed by: INTERNAL MEDICINE

## 2022-11-03 NOTE — PROGRESS NOTES
11/3/2022   Patient Name: Caryn Meckel  : 1950  Medical Record: [id number]    MEDICATIONS  Current Outpatient Medications   Medication Sig Dispense Refill    hydroxychloroquine (PLAQUENIL) 200 MG tablet       BASAGLAR KWIKPEN 100 UNIT/ML injection pen       naproxen (NAPROSYN) 500 MG tablet Take 1 tablet by mouth 2 times daily (with meals) 60 tablet 5    Continuous Blood Gluc Transmit (DEXCOM G6 TRANSMITTER) MISC 1 EACH SEE INSTRUCTIONS. CHANGE DEVICE EVERY 3 MONTHS. Continuous Blood Gluc Sensor (DEXCOM G6 SENSOR) MISC 1 each Every 10 days      insulin aspart (NOVOLOG) 100 UNIT/ML injection vial Inject 125 Units into the skin daily      diclofenac sodium (VOLTAREN) 1 % GEL Apply 4 g topically 4 times daily 1 Tube 1    EVISTA 60 MG tablet TAKE 1 TABLET AT BEDTIME 90 tablet 1    therapeutic multivitamin-minerals (THERAGRAN-M) tablet Take 1 tablet by mouth daily. atorvastatin (LIPITOR) 20 MG tablet Take 20 mg by mouth every evening        No current facility-administered medications for this visit. ALLERGIES  Allergies   Allergen Reactions    Imuran [Azathioprine] Other (See Comments)     Causes liver enzymes to increase     Sulfa Antibiotics Hives    Codeine Nausea And Vomiting         Comments  No specialty comments available. Background history:  Caryn Meckel is a 67 y.o. female who is being seen for follow up evaluation of  bilateral hand pain. Symptoms started 3 days ago and is worse in the left hand mainly in the left third PIP joint. She has been on daily basis, 4 out of 10, aching, worse with using her hands without any significant relieving factors. She has noticed swelling in left third PIP joint. She has a morning stiffness lasting for few minutes. She has some difficulty opening jars. She denies any other joint pain or swelling or stiffness. She has tried over-the-counter Aleve and Advil with relief.   She denies fever, weight loss, night sweats or swollen glands. Her grandmother had rheumatoid arthritis. She denies psoriasis, inflammatory bowel disease, inflammatory back pain, dactylitis, enthesitis, tenosynovitis and uveitis. She had left hand x-ray which showed changes consistent with degenerative arthritis mainly in her DIP and PIP joints. Interim history:. She presents for a follow-up of erosive osteoarthritis. She takes naproxen 500 mg as needed and uses diclofenac gel as needed. She gets occasional achiness in the hands associated with stiffness. She denies any side effects with the medications. Blood work shows positive RF 14, CCP negative, normal ESR and CRP. Hand x-rays with erosive osteoarthritis. HPI  Review of Systems    REVIEW OF SYSTEMS: Positive for fatigue  Constitutional: No unanticipated weight loss or fevers. Integumentary: No rash, photosensitivity, malar rash, livedo reticularis, alopecia and Raynaud's symptoms, sclerodactyly, skin tightening  Eyes: negative for visual disturbance and persistent redness, discharge from eyes   ENT: - No tinnitus, loss of hearing, vertigo, or recurrent ear infections.  - No history of nasal/oral ulcers. - No history of dry eyes/dry mouth  Cardiovascular: No history of pericarditis, chest pain or murmur or palpitations  Respiratory: No shortness of breath, cough or history of interstitial lung disease. No history of pleurisy. No history of tuberculosis or atypical infections. Gastrointestinal: No history of heart burn, dysphagia or esophageal dysmotility. No change in bowel habits or any inflammatory bowel disease. Genitourinary: No history renal disease, miscarriages. Hematologic/Lymphatic: No abnormal bruising or bleeding, blood clots or swollen lymph nodes. Neurological: No history of headaches, seizure or focal weakness.  No history of neuropathies, paresthesias or hyperesthesias, facial droop, diplopia  Psychiatric: No history of bipolar disease, depression  Endocrine: Denies any polyuria, polydipsia and osteoporosis  Allergic/Immunologic: No nasal congestion or hives. I have reviewed patients Past medical History, Social History and Family History as mentioned in her chart and this remains unchanged fromprevious.     Past Medical History:   Diagnosis Date    Anxiety     DM (diabetes mellitus), type 1 ([facility] [address]     Osteoarthritis     Other and unspecified hyperlipidemia     Type 1 diabetes mellitus with complication ([facility] [address] 6/2/2175    Urinary incontinence      Past Surgical History:   Procedure Laterality Date    Canel[address] 2266    2    COLONOSCOPY  4/23/2015    normal-ghastine    GALLBLADDER SURGERY  1996    KNEE ARTHROSCOPY Left 2001    IA REPAIR MAJOR PERIPHERAL NERVE Left 8/22/2019    LEFT ULNAR NERVE DECOMPRESSION (AT ELBOW) performed by Pushpa Hoffman MD at [facility] Left 08/22/2019    LEFT ULNAR NERVE DECOMPRESSION (AT ELBOW) (Left )     Social History     Socioeconomic History    Marital status:      Spouse name: Not on file    Number of children: 2    Years of education: Not on file    Highest education level: Not on file   Occupational History    Occupation: Retired     Comment: elementary school   Tobacco Use    Smoking status: Never    Smokeless tobacco: Never   Vaping Use    Vaping Use: Never used   Substance and Sexual Activity    Alcohol use: No    Drug use: No    Sexual activity: Yes     Partners: Female, Male   Other Topics Concern    Not on file   Social History Narrative    Not on file     Social Determinants of Health     Financial Resource Strain: Not on file   Food Insecurity: Not on file   Transportation Needs: Not on file   Physical Activity: Not on file   Stress: Not on file   Social Connections: Not on file   Intimate Partner Violence: Not on file   Housing Stability: Not on file     Family History   Problem Relation Age of Onset    Cancer Mother         Tigress leukemia    Colon Cancer Father     Heart Disease Maternal Grandmother     Heart Disease Maternal Grandfather     Heart Attack Maternal Grandfather     Arthritis Paternal Grandmother          PHYSICAL EXAMINATION:  Vitals:    11/03/22 0955   BP: 138/82   Site: Right Upper Arm   Position: Sitting   Cuff Size: Large Adult   Temp: 97 °F (36.1 °C)   TempSrc: Infrared   Weight: 158 lb 6.4 oz (71.8 kg)       Physical Exam  Constitutional:  Well developed, well nourished, no acute distress, non-toxic appearance   Musculoskeletal:    RIGHT  Swell  Tender  ROM  LEFT  Swell  Tender  ROM    DIP2  0  0  Heberden   0  0  Heberden    DIP3  0  0  Heberden   0  0  Heberden    DIP4  0  0  Heberden   0  0  Heberden    DIP5  0  0  Heberden   0  0  Heberden    PIP1  0  0  Bony change   0  0  Bony change    PIP2  0 0 Bony change   0 0 Bony change ++   PIP3  0 0 Bony change   0 0 Bony change    PIP4  0  0  FULL   0  0  FULL    PIP5  0  0  FULL   0  0  FULL    MCP1  0  0  FULL   0  0  FULL    MCP2  0  0  FULL   0  0  FULL    MCP3  0  0  FULL   0  0  FULL    MCP4  0  0  FULL   0  0  FULL    MCP5  0  0  FULL   0  0  FULL    Wrist  0  0  FULL   0  0  FULL    Elbow  0  0  FULL   0  0  FULL    Shouldr  0  0  FULL   0  0  FULL    Hip  0  0  FULL   0  0  FULL    Knee  0  0   crepitus  0  0   crepitus   Ankle  0  0  FULL   0  0  FULL    MTP1  0  0  FULL   0  0  FULL    MTP2  0  0  FULL   0  0  FULL    MTP3  0  0  FULL   0  0  FULL    MTP4  0  0  FULL   0  0  FULL    MTP5  0  0  FULL   0  0  FULL    IP1  0  0  FULL   0  0  FULL    IP2  0  0  FULL   0  0  FULL    IP3  0  0  FULL   0  0  FULL    IP4  0  0  FULL   0  0  FULL    IP5  0  0  FULL   0 0 FULL     Bony enlargement and squaring of bilateral CMC joints. Ambulates without assistance, normal gait  Neck: Full ROM, no tenderness,supple   Back- no tenderness. Eyes:  PERRL, extra ocular movements intact, conjunctiva normal   HEENT:  Atraumatic, normocephalic, external ears normal, oropharynx moist, no pharyngeal exudates.    Respiratory:  No respiratory distress  GI:  Soft, nondistended, normal bowel sounds, nontender, noorganomegaly, no mass, no rebound, no guarding   :  No costovertebral angle tenderness   Integument:  Well hydrated, no rash or telangiectasias  Lymphatic:  No lymphadenopathy noted   Neurologic:   Alert & oriented x 3, CN 2-12 normal, no focal deficits noted. Sensations Intact. Muscle strength 5/5 proximallyand distally in upper and lower extremities.    Psychiatric:  Speech and behavior appropriate         LABS AND IMAGING  Outside data reviewed and in HPI    Lab Results   Component Value Date/Time    WBC 7.9 03/18/2020 11:25 AM    RBC 4.80 03/18/2020 11:25 AM    HGB 14.3 03/18/2020 11:25 AM    HCT 44.9 03/18/2020 11:25 AM     03/18/2020 11:25 AM    MCV 93.7 03/18/2020 11:25 AM    MCH 29.8 03/18/2020 11:25 AM    MCHC 31.8 03/18/2020 11:25 AM    RDW 13.5 03/18/2020 11:25 AM    SEGSPCT 50.9 07/06/2011 12:00 AM    LYMPHOPCT 25.0 03/18/2020 11:25 AM    MONOPCT 9.7 03/18/2020 11:25 AM    EOSPCT 1.7 07/06/2011 12:00 AM    BASOPCT 0.9 03/18/2020 11:25 AM    MONOSABS 0.8 03/18/2020 11:25 AM    LYMPHSABS 2.0 03/18/2020 11:25 AM    EOSABS 0.1 03/18/2020 11:25 AM    BASOSABS 0.1 03/18/2020 11:25 AM       Chemistry        Component Value Date/Time     03/17/2022 0926    K 5.2 (H) 03/17/2022 0926     03/17/2022 0926    CO2 27 03/17/2022 0926    BUN 14 03/17/2022 0926    CREATININE 0.8 03/17/2022 0926        Component Value Date/Time    CALCIUM 9.9 03/17/2022 0926    ALKPHOS 75 03/17/2022 0926    AST 19 03/17/2022 0926    ALT 11 03/17/2022 0926    BILITOT 0.4 03/17/2022 0926          Lab Results   Component Value Date    SEDRATE 5 05/22/2019     Lab Results   Component Value Date    CRP 1.6 05/22/2019     Lab Results   Component Value Date/Time    ARIELA Negative 05/22/2019 11:35 AM     Lab Results   Component Value Date/Time    RF 14.0 05/22/2019 11:35 AM    CCPABIGG 2 05/22/2019 11:35 AM     Lab Results   Component Value Date/Time    ARIELA Negative 05/22/2019 11:35 AM     No results found for: DSDNAG, DSDNAIGGIFA  No results found for: Debera Caper  No results found for: SMAB, RNPAB  No results found for: CENTABIGG  No results found for: C3, C4, ACE  No results found for: Gerrianne Massy, [id number]  No results found for: Mark Mari  No results found for: Young Garcia  Lab Results   Component Value Date    TSH 3.98 07/06/2011     No results found for: VITD25    Radiology:    Left hand X-ray images were reviewed independently-degenerative arthritis involving DIP and PIP joints with SEA GULL appearance consistent with erosive osteoarthritis. No evidence of rheumatoid arthritis    ASSESSMENT AND PLAN      Assessment/Plan:      ASSESSMENT:    1. Erosive osteoarthritis of both hands        PLAN:   1. Erosive osteoarthritis of both hands  Bilateral hand x-rays with erosive osteoarthritis. RF 14, CCP negative, normal ESR and CRP  No active synovitis on joint exam  Continue naproxen 500 mg twice a day as needed and diclofenac gel as needed  Continue hand exercises-  Paraffin wax bath  - Comprehensive Metabolic Panel; Future     Diagnosis Orders   1. Erosive osteoarthritis of both hands  Comprehensive Metabolic Panel         The patient indicates understanding of these issues and agrees with the plan. Return in about 6 months (around 5/3/2023). The risks and benefits of my recommendations, as well as other treatment options, benefits and side effects werediscussed with the patient. All questions were answered. ######################################################################    I thank you for giving me theopportunity to participate in [facility]. If you have any questions or concerns please feel free to contact me. I look forward to following  Tori Rodriguezs along with you.       Electronically signed by: Aaliyah Redd MD, MD, 11/3/2022 1:32 PM    Documentation was done using voice recognition dragon software. Every effort was made to ensure accuracy;however, inadvertent unintentional computerized transcription errors may be present.

## 2023-03-23 ENCOUNTER — HOSPITAL ENCOUNTER (OUTPATIENT)
Dept: WOMENS IMAGING | Age: 73
Discharge: HOME | End: 2023-03-23
Payer: MEDICARE

## 2023-03-23 DIAGNOSIS — R92.8 ABNORMAL MAMMOGRAM: ICD-10-CM

## 2023-03-23 DIAGNOSIS — Z12.31 BREAST CANCER SCREENING BY MAMMOGRAM: ICD-10-CM

## 2023-03-23 PROCEDURE — G0279 TOMOSYNTHESIS, MAMMO: HCPCS

## 2023-03-23 PROCEDURE — 77063 BREAST TOMOSYNTHESIS BI: CPT

## 2023-05-17 NOTE — LETTER
[address] [place] [address]  Phone: [phone]  Fax: [phone]    Sharon Church MD        May 22, 2019     Feliciano Werner [facility] [address] [facility]  Feliciano Werner MD  [address] [place] [address]    Patient: Abhijeet Gamino  MR Number: [id number]  YOB: 1950  Date of Visit: 5/22/2019    Dear Dr. Feliciano Werner: Thank you for your referral. Progress note attached in visit summary. If you have questions, please do not hesitate to call me. I look forward to following Jessy De La Cruz along with you.     Sincerely,        Sharon Church MD
N/A

## 2023-09-21 ENCOUNTER — HOSPITAL ENCOUNTER (OUTPATIENT)
Dept: WOMENS IMAGING | Age: 73
Discharge: HOME | End: 2023-09-21
Attending: STUDENT IN AN ORGANIZED HEALTH CARE EDUCATION/TRAINING PROGRAM
Payer: MEDICARE

## 2023-09-21 DIAGNOSIS — Z79.818 LONG TERM (CURRENT) USE OF OTHER AGENTS AFFECTING ESTROGEN RECEPTORS AND ESTROGEN LEVELS: ICD-10-CM

## 2023-09-21 DIAGNOSIS — Z13.820 SCREENING FOR OSTEOPOROSIS: ICD-10-CM

## 2023-09-21 PROCEDURE — 77080 DXA BONE DENSITY AXIAL: CPT

## 2023-10-18 LAB — HBA1C MFR BLD HPLC: 6.3 %

## 2024-03-28 ENCOUNTER — HOSPITAL ENCOUNTER (OUTPATIENT)
Dept: WOMENS IMAGING | Age: 74
Discharge: HOME | End: 2024-03-28
Payer: MEDICARE

## 2024-03-28 DIAGNOSIS — Z12.31 SCREENING MAMMOGRAM FOR BREAST CANCER: ICD-10-CM

## 2024-03-28 PROCEDURE — 77063 BREAST TOMOSYNTHESIS BI: CPT

## 2024-11-06 ENCOUNTER — HOSPITAL ENCOUNTER (OUTPATIENT)
Age: 74
Discharge: HOME | End: 2024-11-06
Payer: MEDICARE

## 2024-11-06 ENCOUNTER — HOSPITAL ENCOUNTER (OUTPATIENT)
Dept: GENERAL RADIOLOGY | Age: 74
Discharge: HOME | End: 2024-11-06
Attending: STUDENT IN AN ORGANIZED HEALTH CARE EDUCATION/TRAINING PROGRAM
Payer: MEDICARE

## 2024-11-06 DIAGNOSIS — M19.031 LOCALIZED PRIMARY OSTEOARTHRITIS OF CARPOMETACARPAL (CMC) JOINT OF RIGHT WRIST: ICD-10-CM

## 2024-11-06 PROCEDURE — 73120 X-RAY EXAM OF HAND: CPT

## 2025-01-23 ENCOUNTER — HOSPITAL ENCOUNTER (OUTPATIENT)
Dept: GENERAL RADIOLOGY | Age: 75
Discharge: HOME | End: 2025-01-23
Attending: INTERNAL MEDICINE
Payer: MEDICARE

## 2025-01-23 ENCOUNTER — HOSPITAL ENCOUNTER (OUTPATIENT)
Age: 75
Discharge: HOME | End: 2025-01-23
Payer: MEDICARE

## 2025-01-23 PROCEDURE — 73130 X-RAY EXAM OF HAND: CPT

## 2025-04-30 ENCOUNTER — OFFICE VISIT (OUTPATIENT)
Dept: ENT CLINIC | Age: 75
End: 2025-04-30
Payer: MEDICARE

## 2025-04-30 VITALS
BODY MASS INDEX: 27.16 KG/M2 | HEART RATE: 97 BPM | DIASTOLIC BLOOD PRESSURE: 76 MMHG | OXYGEN SATURATION: 96 % | SYSTOLIC BLOOD PRESSURE: 135 MMHG | WEIGHT: 163 LBS | HEIGHT: 65 IN

## 2025-04-30 DIAGNOSIS — R09.81 NASAL CONGESTION: ICD-10-CM

## 2025-04-30 DIAGNOSIS — H93.8X3 SENSATION OF FULLNESS IN BOTH EARS: Primary | ICD-10-CM

## 2025-04-30 DIAGNOSIS — H93.13 TINNITUS OF BOTH EARS: ICD-10-CM

## 2025-04-30 DIAGNOSIS — H91.93 BILATERAL HEARING LOSS, UNSPECIFIED HEARING LOSS TYPE: ICD-10-CM

## 2025-04-30 PROCEDURE — 1159F MED LIST DOCD IN RCRD: CPT | Performed by: STUDENT IN AN ORGANIZED HEALTH CARE EDUCATION/TRAINING PROGRAM

## 2025-04-30 PROCEDURE — 69210 REMOVE IMPACTED EAR WAX UNI: CPT | Performed by: STUDENT IN AN ORGANIZED HEALTH CARE EDUCATION/TRAINING PROGRAM

## 2025-04-30 PROCEDURE — 99203 OFFICE O/P NEW LOW 30 MIN: CPT | Performed by: STUDENT IN AN ORGANIZED HEALTH CARE EDUCATION/TRAINING PROGRAM

## 2025-04-30 PROCEDURE — 1123F ACP DISCUSS/DSCN MKR DOCD: CPT | Performed by: STUDENT IN AN ORGANIZED HEALTH CARE EDUCATION/TRAINING PROGRAM

## 2025-04-30 RX ORDER — GABAPENTIN 100 MG/1
100 CAPSULE ORAL NIGHTLY
COMMUNITY

## 2025-04-30 RX ORDER — GABAPENTIN 300 MG/1
CAPSULE ORAL
COMMUNITY
Start: 2025-04-19

## 2025-04-30 RX ORDER — INSULIN LISPRO 100 [IU]/ML
INJECTION, SOLUTION INTRAVENOUS; SUBCUTANEOUS
COMMUNITY
Start: 2025-03-27

## 2025-04-30 NOTE — PROGRESS NOTES
intact  Bilateral impacted cerumen  Mouth/Oral Cavity:  normal lips, Uvula is midline, no mucosal lesions, no trismus, normal dentition, normal salivary quality/flow  Oropharynx/Larynx:  normal oropharynx, 1+ tonsils  Nose:Normal external nasal appearance.  Anterior rhinoscopy shows  a deviated septum preventing view posteriorly.  Hypertrophy of turbinates.  Normal mucosa   NECK: Normal range of motion, no thyromegaly, trachea is midline, no lymphadenopathy, no neck masses, no crepitus  CHEST: Normal respiratory effort, no retractions, breathing comfortably  SKIN: No rashes, normal appearing skin, no evidence of skin lesions/tumors  Neuro:  cranial nerve II-XII intact; normal gait  Cardio:  no edema        PROCEDURE    Use of Operating Microscope and Cerumen Removal CPT code 96414-40  Indications: Bilateral cerumen impaction obstructing visualization of the tympanic membrane(s).      An operating microscope was utilized to visualize the external auditory canals using a speculum. The external auditory canals were occluded with cerumen bilaterally. The cerumen and debris was removed with instrumentation including suction and currettes under microscopic evaluation. The bilateral tympanic membrane(s) and ossicles are intact. No fluid was visualized in the bilateral middle ears.            The patient (or guardian, if applicable) and other individuals in attendance with the patient were advised that Artificial Intelligence will be utilized during this visit to record, process the conversation to generate a clinical note, and support improvement of the AI technology. The patient (or guardian, if applicable) and other individuals in attendance at the appointment consented to the use of AI, including the recording.      This note was generated completely or in part utilizing Dragon dictation speech recognition software.  Occasionally, words are mistranscribed and despite editing, the text may contain inaccuracies due to

## 2025-05-08 ENCOUNTER — HOSPITAL ENCOUNTER (OUTPATIENT)
Dept: WOMENS IMAGING | Age: 75
Discharge: HOME | End: 2025-05-08
Payer: MEDICARE

## 2025-05-08 VITALS — BODY MASS INDEX: 27.16 KG/M2 | WEIGHT: 163 LBS | HEIGHT: 65 IN

## 2025-05-08 DIAGNOSIS — Z12.31 BREAST CANCER SCREENING BY MAMMOGRAM: ICD-10-CM

## 2025-05-08 PROCEDURE — 77067 SCR MAMMO BI INCL CAD: CPT

## 2025-05-22 ENCOUNTER — TELEPHONE (OUTPATIENT)
Dept: PHARMACY | Facility: CLINIC | Age: 75
End: 2025-05-22

## 2025-05-22 NOTE — TELEPHONE ENCOUNTER
[facility] CLINICAL PHARMACY: STATIN THERAPY REVIEW  Identified statin use in persons with diabetes care gap per [facility]. Records dated: 5/19/25.    Last Office Visit: 12/2/24 with Clem Diop MD    Patient also appears to be prescribed the following medications in an adherence measure: excluded (on insulin)    Patient is prescribed the following for lipid management: atorvastatin 20 mg    Allergies   Allergen Reactions    Imuran [Azathioprine] Other (See Comments)     Causes liver enzymes to increase     Sulfa Antibiotics Hives    Codeine Nausea And Vomiting         ASSESSMENT    DIABETES ADHERENCE  Insurance Records claims through 5/19/25  YTD PDC = EXCLUDED (on insulin):     Lab Results   Component Value Date    LABA1C 6.2 09/23/2024    LABA1C 6.3 07/18/2024    LABA1C 6.2 01/18/2024     NOTE: A1c not yet completed this calendar year        STATIN ADHERENCE  Insurance Records claims through 5/19/25  YTD PDC = no refills/claims in 2025:    Per Reconciled Dispense Report:  ATORVASTATIN TAB 20MG 05/01/2025 90 90 tablet Nadia Sears MD [facility] [id number]...   ATORVASTATIN TAB 20MG 02/03/2025 90 90 tablet Nadia Sears MD [facility] [id number]...   ATORVASTATIN TAB 20MG 11/09/2024 90 90 tablet Nadia Sears MD [facility] [id number]...   ATORVASTATIN TAB 20MG 08/13/2024 90 90 tablet Nadia Sears MD [facility] [id number]...   ATORVASTATIN 20MG TAB 04/30/2024 90 90 tablet Nadia Sears MD [facility] [id number]...   ATORVASTATIN 20MG TAB 02/13/2024 90 90 tablet Nadia Sears MD [facility] [id number]...     Per [facility] Portal:  No refills/claims for atorvastatin in [facility] portal  [facility] insurance since 2020 per portal  Appears only filling insulin with [facility]    Per [facility]:   Patient is not using [facility] to refill atorvastatin; patient may have a second insurance that is being used for all medications (with exception of insulin and vaccinations).

## (undated) DEVICE — ZIMMER® STERILE DISPOSABLE TOURNIQUET CUFF WITH PLC, DUAL PORT, SINGLE BLADDER, 18 IN. (46 CM)

## (undated) DEVICE — SHEET,DRAPE,53X77,STERILE: Brand: MEDLINE

## (undated) DEVICE — COVER LT HNDL BLU PLAS

## (undated) DEVICE — SUTURE CHROMIC GUT SZ 4-0 L27IN ABSRB BRN FS-2 L19MM 3/8 635H

## (undated) DEVICE — SPONGE GZ W4XL4IN COT 12 PLY TYP VII WVN C FLD DSGN

## (undated) DEVICE — BANDAGE COMPR W4INXL15FT BGE E SGL LAYERED CLP CLSR

## (undated) DEVICE — INTENDED FOR TISSUE SEPARATION, AND OTHER PROCEDURES THAT REQUIRE A SHARP SURGICAL BLADE TO PUNCTURE OR CUT.: Brand: BARD-PARKER ® STAINLESS STEEL BLADES

## (undated) DEVICE — GOWN,SIRUS,POLYRNF,BRTHSLV,XL,30/CS: Brand: MEDLINE

## (undated) DEVICE — SUTURE VCRL SZ 3-0 L27IN ABSRB UD L26MM SH 1/2 CIR J416H

## (undated) DEVICE — BANDAGE COMPR W4INXL12FT E DISP ESMARCH EVEN

## (undated) DEVICE — GLOVE SURG SZ 65 L12IN FNGR THK87MIL WHT LTX FREE

## (undated) DEVICE — UNDERGLOVE SURG SZ 8 FNGR THK0.21MIL GRN LTX BEAD CUF

## (undated) DEVICE — DRAPE HND W114XL142IN BLU POLYPR W O PCH FEN CRD AND TB HLDR

## (undated) DEVICE — GOWN SIRUS NONREIN XL W/TWL: Brand: MEDLINE INDUSTRIES, INC.

## (undated) DEVICE — MINOR SET UP PK

## (undated) DEVICE — GLOVE SURG SZ 8 L12IN FNGR THK94MIL STD WHT LTX SYN POLYMER

## (undated) DEVICE — PADDING UNDERCAST W4INXL4YD 100% COT CRIMPED FINISH WBRL II

## (undated) DEVICE — DRESSING PETRO W3XL3IN OIL EMUL N ADH GZ KNIT IMPREG CELOS

## (undated) DEVICE — CHLORAPREP 26ML ORANGE

## (undated) DEVICE — Z DISCONTINUED USE 2275676 GLOVE SURG SZ 65 L12IN FNGR THK87MIL DK GRN LTX FREE ISOLEX

## (undated) DEVICE — SOLUTION IV IRRIG 250ML ST LF 0.9% SODIUM 2F7122